# Patient Record
Sex: MALE | Race: BLACK OR AFRICAN AMERICAN | Employment: UNEMPLOYED | URBAN - METROPOLITAN AREA
[De-identification: names, ages, dates, MRNs, and addresses within clinical notes are randomized per-mention and may not be internally consistent; named-entity substitution may affect disease eponyms.]

---

## 2017-11-07 ENCOUNTER — ALLIED HEALTH/NURSE VISIT (OUTPATIENT)
Dept: NEUROLOGY | Facility: CLINIC | Age: 27
End: 2017-11-07

## 2017-11-07 ENCOUNTER — OFFICE VISIT (OUTPATIENT)
Dept: NEUROLOGY | Facility: CLINIC | Age: 27
End: 2017-11-07

## 2017-11-07 VITALS
SYSTOLIC BLOOD PRESSURE: 116 MMHG | HEART RATE: 91 BPM | DIASTOLIC BLOOD PRESSURE: 71 MMHG | HEIGHT: 70 IN | BODY MASS INDEX: 18.84 KG/M2 | WEIGHT: 131.6 LBS

## 2017-11-07 DIAGNOSIS — R68.89 SPELLS OF DECREASED ATTENTIVENESS: Primary | ICD-10-CM

## 2017-11-07 RX ORDER — LORAZEPAM 2 MG/1
TABLET ORAL
Qty: 2 TABLET | Refills: 0 | Status: ON HOLD | OUTPATIENT
Start: 2017-11-07 | End: 2018-01-15

## 2017-11-07 RX ORDER — LEVETIRACETAM 1000 MG/1
1000 TABLET ORAL 2 TIMES DAILY
Status: ON HOLD | COMMUNITY
Start: 2017-09-10 | End: 2018-01-15

## 2017-11-07 RX ORDER — LEVETIRACETAM 750 MG/1
TABLET ORAL
Qty: 120 TABLET | Refills: 3 | Status: SHIPPED | OUTPATIENT
Start: 2017-11-07 | End: 2018-09-14

## 2017-11-07 NOTE — LETTER
2017       RE: Geovanni Mullen  : 1990   MRN: [id number]      Dear Colleague,    Thank you for referring your patient, Geovanni Mullen, to the [facility]PILEPSY [facility] at [facility]. Please see a copy of my visit note below.    NEW PATIENT NOTE   IDENTIFYING INFORMATION:  Geovanni is accompanied with his girlfriend, Stephanie Torrez.  Taylorrhina provided much of the history.  He is a 27-year-old, left-handed male who presents with recurrent spells.  His first spell was 2016.  At that time he had smaller spells, in which he had an aura of feeling lightheaded, sweaty, dizzy, heart palpitations, anxiety, d?j? vu sensation, and sometimes this would progress to unresponsiveness, lip smacking and repetitive hand movements.  Early on, he was treated as though these were anxiety attacks; the healthcare providers did not think they were seizures.  He had one every month every other month, it seemed early on, and then this progressed to a generalized tonic-clonic convulsion in 2016.  He was sleeping at night.  Stephanie noted his whole body stiffened and rhythmically shook.  He had blood in his mouth, foaming at the mouth, and eyes were rolled back.  This lasted a few minutes, and it was suspected that perhaps these were seizures, and he was started on Depakote.  In 2016, Depakote was not effective, and he was transitioned to levetiracetam in 2016.  It seemed that his anxiety and panic attacks went down significantly, and he stopped having the smaller spells in which he felt lightheaded, sweaty, dizzy, heart palpitations, anxiety and d?j? vu.  However, he continued to have the spells in which he was unresponsive and the convulsions.  The patient is on levetiracetam 1000 mg twice a day with no significant side effects.  He did notice better control when he increased from 9571-2922 mg per day of levetiracetam.  He does not have a history of status epilepticus.        Spell type 1 is  lightheadedness, sweatiness, dizziness, heart palpitations, anxiety, d?j? vu feeling as though he has been there before.  He does at times have an escalating experience of anxiety, and it builds up and then he cannot breathe, and he hyperventilates and he cannot catch a breath, he states.  Interestingly enough, levetiracetam decreased these spells significantly.  The last one was 2017.  It seems to me that he may have 2 different types of spells.  One spell, he has d?j? vu, palpitations, sweatiness, lightheadedness with no escalating anxiety, and another set of spells, a subcategory, in which he has escalating anxiety, which may perhaps be panic attacks.  The initial spells may be simple partial seizures.        Spell type 2 is he has an aura in which he feels sick.  He feels anxiety, becomes unresponsive, has chewing movements, repetitive picking movements of his right hand.  These started in 2016, and the last time he had one was 10/30/2017 before [facility].  These usually last 1 minute.  He has probably has 10 witnessed spells since 2016.        Spell type 3 is whole body convulsion.  Usually in these the whole body stiffens, has rhythmic shaking, eyes roll back.  He makes a gurgling noise.  He usually bites his tongue.  He does not have urinary incontinence.  He had one at the end of 10/2017.  He has 1-3 per month usually.  He has also had instances where he has had 3 convulsions within a 24 hour period, 1 hour spaced by 1 hour.  He has not had significant trauma with his seizures.  He has bitten his tongue.  Seizures are triggered by fatigue, lack of sleep, television video games.        EPILEPSY RISK FACTORS:  His grandmother had a stroke and had seizures.  Mom had a  delivery, full-term.  He had no developmental delays.  He did hit his head in childhood when he was playing football.  No associated loss of consciousness.  He has no history of encephalitis, meningitis, strokes or tumors.   "No epilepsy or febrile seizures in childhood.      MEDICATIONS:  Levetiracetam 1000 mg twice a day.      PAST ANTIEPILEPTIC DRUGS:  Depakote 500 mg twice a day was not effective.        ALLERGIES TO MEDICATIONS:  No.        He does not use a pillbox.  I did give him a pillbox today and encouraged him to use it.      MEDICAL PROBLEMS:  The patient states he had a lung collapse.  He was unable to breathe while playing basketball, and he was hospitalized for this several years ago; I was unable to obtain further details on this.  He states that he did not have blunt trauma causing the lung to collapse.        FAMILY HISTORY:  Grandmother had stroke and seizures.      SOCIAL HISTORY:  He was born in [place].  He lived there until 9 years old, and then he moved to [place], then [place].  He moved to [place] when he was 11 years old.  He has never had any death experiences, physical or sexual abuse.  He completed high school.  He has had temporary jobs, mainly fast-food jobs.  He has 5 kids, ages 1, 3, 5, 7 and 8.  He is their primary caretaker.  He states he cannot work, and his girlfriend has to work for finances.  He feels overwhelmed by this and states that, \"The way I was raised, the man is supposed to work and bring home the money.\"  Alcohol:  He does not drink alcohol.  He smokes 5-6 cigarettes a day.  He has 6 cans of soda per day.  He smokes marijuana 3 times per week.  In the last month, he has not felt depressed, but he does feel helpless about the future because he is not able to work, and finances are a stress.  He does not have anhedonia.  He does have trouble sleeping at night.  He falls asleep at 3 a.m. in the morning.  He usually sleeps a couple hours and may take a nap in the morning.  He does not have regular sleep habits.        REVIEW OF SYSTEMS:  Notable for headache, right eye twitching, diffuse weakness, decreased appetite, constipation.  He sleeps 5-6 hours per night, usually at 3:00 " "a.m. in the morning.  He does have decreased memory.      EXAMINATION /71 (BP Location: Right arm, Patient Position: Chair, Cuff Size: Adult Regular)  Pulse 91  Ht 5' 10\" (177.8 cm)  Wt 131 lb 9.6 oz (59.7 kg)  BMI 18.88 kg/m2  GENERAL:  Alert and oriented x3.   CARDIOVASCULAR:  Regular rate and rhythm, positive S1, S2.   LUNGS:  Clear to auscultation bilaterally.   ABDOMEN:  Nondistended, nontender.  Normal active bowel sounds.      NEUROLOGICAL EXAMINATION   Mental Status and Higher Cortical Functions:  Alert and oriented to person, place, and time.  Speech fluent, with intact naming and repetition. No dysarthria.  Cranial Nerves (II-XII):  Pupils equal, round, and reactive to light.  Extraocular movements full with no nystagmus.  Visual fields full to confrontation.  Facial sensation intact to light touch, temperature, and pin prick.  Face symmetric at rest and with activation.  Hearing intact to finger rub bilaterally.  Tongue midline and palate elevation symmetric.  Sternocleidomastoid and trapezius 5/5 bilaterally.  Funduscopic exam was attempted, but it was difficult to visualize his fundi.  He had very small pupils.   Motor:  Normal tone, normal bulk, and no pronator drift.  No tremors or fasciculations. Arm/hand circumduction was symmetric.  Motor strength 5/5 in upper and lower extremities.   Sensation:  Intact to light touch, vibration, and temperature.    Coordination:  Normal finger-nose-finger, fine finger movements, and rapid alternating movements.  No ataxia or dysmetria.     Reflexes:  Deep tendon reflexes 2+ and symmetric throughout.    Gait:  Casual gait and stance normal.        IMPRESSION:  Mr. Mullen is a 27-year-old, left-handed male who presents with recurrent paroxsymal spells. The clinical description of this spells are concerning for seizures.  I suspect that the patient has simple partial seizures, complex partial seizures, and complex partial seizure that secondarily " generalize. 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  There are some suggestions that he may have anxiety attacks which may be due to anxiety disorder and auras (that have anxiety as a component) that may be simple partial seizures, but it is difficult to delineate clinically. It would be helpful to admit patient for prolonged Video EEG monitoring to charterize spells, determine interictal, and ictal burden, seizure type and onset and better define semiology.  We will increase levetiracetam to 1500 mg twice a day, since he continues to have a high burden of recurrent paroxysmal spells that are concerning for seizures despite being on levetiracetam 1000 mg twice a day.  The patient was agreeable to being admitted to the hospital and understands he may have to stay there 5-7 days, and they should find alternate childcare arrangements.         PLAN:     1.  Increase levetiracetam to 1500 mg twice a day.                    Medication Name   Tablet Size         AM  (morning)   PM (Night)   Notes    Week 1   levetiracetam 750 mg   1 tablet   (750 mg)    2 tablet   (1500 mg)      Week 2   levetiracetam 750 mg   2 tablet   (1500 mg)   2 tablet   (1500 mg)         2.  MRI of the brain to evaluate for etiology of seizures.   3.  Inpatient hospital admission.   4.  Inpatient psychiatrist consultation   5.  Nurse education on hospital admission.   6.  The patient was advised to maintain proper seizure precautions. [place] regulations on driving were reviewed with the patient. The patient clearly understands that she/he is prohibited from operating a motor vehicle within 3 months following any seizure or other episode with sudden unconsciousness or inability to sit up, and that it is required to report most recent seizure to the DMV within 30 days after the event.    Patient was advised to avoid any activities that might lead to self-injury or injury of others, within 3 months following any seizure  with impaired awareness or impaired motor control such activities include but are not limited to operating power tools, operating firearms, climbing ladders/trees/exposure to heights from which he might fall. Patient should not operate power tools or heavy machinery and equipment.  Patient was advised not to swim alone.  Patient should not bathe in any form of tub, such as bathtub, jacuzzi, or hot tub unless there is a responsible adult close by to provide assistance in case she has a seizure and drowns. Patient should not work on hot surfaces such stoves, ovens, or with scalding hot water.   7.   Follow up after hospital admission.       I spent 60 minutes with the patient. During this time counseling and coordination of care exceeded 50% of the face to face visit time. I addressed all questions the patient raised in regards to their medical care.           SIRISHA RIBEIRO MD             D: 2017 16:35   T: 2017 11:04   MT: [email or web address]      Name:     MELANIE HUMPHREY   MRN:      [id number]        Account:      [id number]   :      1990           Service Date: 2017      Document: [id number]        Again, thank you for allowing me to participate in the care of your patient.      Sincerely,    Sirisha Ribeiro MD

## 2017-11-07 NOTE — MR AVS SNAPSHOT
After Visit Summary   11/7/2017    Geovanni Mullen    MRN: [id number]           Patient Information     Date Of Birth          1990        Visit Information        Provider Department      11/7/2017 9:00 AM Sirisha Baxter MD [facility]        Today's Diagnoses     Spells of decreased attentiveness    -  1      Care Instructions                     Medication Name   Tablet Size         AM  (morning)   PM (Night)   Notes    Week 1   levetiracetam 750 mg   1 tablet   (750 mg)    2 tablet   (1500 mg)      Week 2   levetiracetam 750 mg   2 tablet   (1500 mg)   2 tablet   (1500 mg)       CONTINUE TAKING YOUR OTHER MEDICATIONS AS PREVIOUSLY DIRECTED.  IF YOU  HAVE ANY SIDE EFFECTS OR CONCERNS ABOUT YOUR ANTIEPILEPTIC DRUG CALL [facility] OFFICE [phone]. PLEASE FOLLOW MEDICATION CHANGES AS ADVISED.       1. Hospital admission (5-7 days)   2. Nurse Education   3. MRI brain take ativan 2 mg tablet 45 minutes before MRI brain. If not sleepy after 20 minutes. Take ativan another 2 mg tablet again. Do not drive.   4. [place] regulations on driving were reviewed with you and you should not drive until you are three months seizure/spell free.You are prohibited from operating a motor vehicle within 3 months following any seizure or other episode with sudden unconsciousness or inability to sit up, and that it is required to report most recent seizure to the DMV within 30 days after the event.    Avoid any activities that might lead to self-injury or injury of others, within 3 months following any seizure with impaired awareness or impaired motor control such activities include but are not limited to operating power tools, operating firearms, climbing ladders/trees/exposure to heights from which he might fall. Do not operate power tools or heavy machinery and equipment.  Do not swim alone, bathe in any form of tub, such as bathtub, jacuzzi, or hot tub unless there is a responsible adult close by to  provide assistance in case she has a seizure and drowns. Avoid work on hot surfaces such stoves, ovens, or with scalding hot water.       ELY RIBEIRO MD               Follow-ups after your visit        Additional Services     [facility] Patient Education Referral                 Your next 10 appointments already scheduled     2017 11:30 AM CST   EEG with [facility] EEG 1   [facility] ([facility])    [address], [address]  [facility] [address] [phone]              Future tests that were ordered for you today     Open Future Orders        Priority Expected Expires Ordered    MR Brain w/o & w Contrast Routine  2018            Who to contact     Please call your clinic at [phone] to:    Ask questions about your health    Make or cancel appointments    Discuss your medicines    Learn about your test results    Speak to your doctor   If you have compliments or concerns about an experience at your clinic, or if you wish to file a complaint, please contact [facility] Patient Relations at [phone] or email us at [email or web address]         Additional Information About Your Visit        MobileRQhart Information     Unicotript is an electronic gateway that provides easy, online access to your medical records. With Netheos, you can request a clinic appointment, read your test results, renew a prescription or communicate with your care team.     To sign up for Unicotript visit the website at [email or web address]   You will be asked to enter the access code listed below, as well as some personal information. Please follow the directions to create your username and password.     Your access code is: [email or web address]  Expires: 2018 11:14 AM     Your access code will  in 90 days. If you need help or a new code, please contact your [facility] or call [phone] for assistance.        Care  "EveryWhere ID     This is your Care EveryWhere ID. This could be used by other organizations to access your [facility] medical records  [id number]        Your Vitals Were     Pulse Height BMI (Body Mass Index)             91 5' 10\" (177.8 cm) 18.88 kg/m2          Blood Pressure from Last 3 Encounters:   11/07/17 116/71    Weight from Last 3 Encounters:   11/07/17 131 lb 9.6 oz (59.7 kg)              We Performed the Following     Admit to Inpatient     [facility] Patient Education Referral          Today's Medication Changes          These changes are accurate as of: 11/7/17 11:14 AM.  If you have any questions, ask your nurse or doctor.               Start taking these medicines.        Dose/Directions    LORazepam 2 MG tablet   Commonly known as:  ATIVAN   Used for:  Spells of decreased attentiveness   Started by:  Sirisha Baxter MD        MRI brain take ativan 2 mg tablet 45 minutes before MRI brain. If not sleepy after 20 minutes. Take ativan another 2 mg tablet again.   Quantity:  2 tablet   Refills:  0         These medicines have changed or have updated prescriptions.        Dose/Directions    * levETIRAcetam 1000 MG Tabs   This may have changed:  Another medication with the same name was added. Make sure you understand how and when to take each.   Used for:  Spells of decreased attentiveness   Changed by:  Sirisha Baxter MD        Dose:  1000 mg   Take 1,000 mg by mouth   Refills:  0       * levETIRAcetam 750 MG tablet   Commonly known as:  KEPPRA   This may have changed:  You were already taking a medication with the same name, and this prescription was added. Make sure you understand how and when to take each.   Used for:  Spells of decreased attentiveness   Changed by:  Sirisha Baxter MD        Titrate to 1500 mg twice a day   Quantity:  120 tablet   Refills:  3       * Notice:  This list has 2 medication(s) that are the same as other medications prescribed for you. Read the directions carefully, and ask your " doctor or other care provider to review them with you.         Where to get your medicines      These medications were sent to [id number] - [place], [place] - [address] AT [facility] [address], [place] [address]     Phone:  [phone]     levETIRAcetam 750 MG tablet         Some of these will need a paper prescription and others can be bought over the counter.  Ask your nurse if you have questions.     Bring a paper prescription for each of these medications     LORazepam 2 MG tablet                Primary Care Provider    None Specified       No primary provider on file.        Equal Access to Services     [facility]: Hadii haleigh Wolf, waaxda lutitoadaha, qaybta kaalmada gertrude, zina franklin . So [facility] [phone].    ATENCIÓN: Si habla español, tiene a cortez disposición servicios gratuitos de asistencia lingüística. [facility] [phone].    We comply with applicable federal civil rights laws and [place] laws. We do not discriminate on the basis of race, color, national origin, age, disability, sex, sexual orientation, or gender identity.            Thank you!     Thank you for choosing [facility] EPILEPSY [facility]  for your care. Our goal is always to provide you with excellent care. Hearing back from our patients is one way we can continue to improve our services. Please take a few minutes to complete the written survey that you may receive in the mail after your visit with us. Thank you!             Your Updated Medication List - Protect others around you: Learn how to safely use, store and throw away your medicines at www.disposemymeds.org.          This list is accurate as of: 11/7/17 11:14 AM.  Always use your most recent med list.                   Brand Name Dispense Instructions for use Diagnosis    * levETIRAcetam 1000 MG Tabs      Take 1,000 mg by mouth    Spells of decreased attentiveness       * levETIRAcetam 750  MG tablet    KEPPRA    120 tablet    Titrate to 1500 mg twice a day    Spells of decreased attentiveness       LORazepam 2 MG tablet    ATIVAN    2 tablet    MRI brain take ativan 2 mg tablet 45 minutes before MRI brain. If not sleepy after 20 minutes. Take ativan another 2 mg tablet again.    Spells of decreased attentiveness       * Notice:  This list has 2 medication(s) that are the same as other medications prescribed for you. Read the directions carefully, and ask your doctor or other care provider to review them with you.

## 2017-11-07 NOTE — LETTER
Date:November 9, 2017      Patient was self referred, no letter generated. Do not send.        [facility] Health Information

## 2017-11-07 NOTE — MR AVS SNAPSHOT
After Visit Summary   2017    Geovanni Mullen    MRN: [id number]           Patient Information     Date Of Birth          1990        Visit Information        Provider Department      2017 11:30 AM [facility] EEG 1 [facility]        Today's Diagnoses     Spells of decreased attentiveness    -  1       Follow-ups after your visit        Who to contact     Please call your clinic at [phone] to:    Ask questions about your health    Make or cancel appointments    Discuss your medicines    Learn about your test results    Speak to your doctor   If you have compliments or concerns about an experience at your clinic, or if you wish to file a complaint, please contact [facility] Patient Relations at [phone] or email us at [email or web address]         Additional Information About Your Visit        MyChart Information     Metwit is an electronic gateway that provides easy, online access to your medical records. With Metwit, you can request a clinic appointment, read your test results, renew a prescription or communicate with your care team.     To sign up for [employer]il[employer] visit the website at [email or web address]   You will be asked to enter the access code listed below, as well as some personal information. Please follow the directions to create your username and password.     Your access code is: [email or web address]  Expires: 2018 11:14 AM     Your access code will  in 90 days. If you need help or a new code, please contact your [facility] or call [phone] for assistance.        Care EveryWhere ID     This is your Care EveryWhere ID. This could be used by other organizations to access your [place] medical records  [id number]         Blood Pressure from Last 3 Encounters:   No data found for BP    Weight from Last 3 Encounters:   No data found for Wt              Today, you had the following     No  orders found for display         Today's Medication Changes          These changes are accurate as of: 11/7/17 11:59 PM.  If you have any questions, ask your nurse or doctor.               Start taking these medicines.        Dose/Directions    LORazepam 2 MG tablet   Commonly known as:  ATIVAN   Used for:  Spells of decreased attentiveness   Started by:  Sirisha Baxter MD        MRI brain take ativan 2 mg tablet 45 minutes before MRI brain. If not sleepy after 20 minutes. Take ativan another 2 mg tablet again.   Quantity:  2 tablet   Refills:  0         These medicines have changed or have updated prescriptions.        Dose/Directions    * levETIRAcetam 1000 MG Tabs   This may have changed:  Another medication with the same name was added. Make sure you understand how and when to take each.   Used for:  Spells of decreased attentiveness   Changed by:  Sirisha Baxter MD        Dose:  1000 mg   Take 1,000 mg by mouth   Refills:  0       * levETIRAcetam 750 MG tablet   Commonly known as:  KEPPRA   This may have changed:  You were already taking a medication with the same name, and this prescription was added. Make sure you understand how and when to take each.   Used for:  Spells of decreased attentiveness   Changed by:  Sirisha Baxter MD        Titrate to 1500 mg twice a day   Quantity:  120 tablet   Refills:  3       * Notice:  This list has 2 medication(s) that are the same as other medications prescribed for you. Read the directions carefully, and ask your doctor or other care provider to review them with you.         Where to get your medicines      These medications were sent to [id number] - [place], [place] - [address] AT [facility] [address], Health system [address]     Phone:  [phone]     levETIRAcetam 750 MG tablet         Some of these will need a paper prescription and others can be bought over the counter.  Ask your nurse if you have questions.     Bring  a paper prescription for each of these medications     LORazepam 2 MG tablet                Primary Care Provider    None Specified       No primary provider on file.        Equal Access to Services     ANIL CROOKS : Hadii aad ku hadjasonnatalie Wilsoncadyali, isaacvj colladolorenaha, jose alejandro franklintori vianeymamie, zina chynain hayaakota wintersmercy jajapeggy noemi vieyra. So [facility] [phone].    ATENCIÓN: Si habla español, tiene a cortez disposición servicios gratuitos de asistencia lingüística. Oseasame al [phone].    We comply with applicable federal civil rights laws and [place] laws. We do not discriminate on the basis of race, color, national origin, age, disability, sex, sexual orientation, or gender identity.            Thank you!     Thank you for choosing [facility]  for your care. Our goal is always to provide you with excellent care. Hearing back from our patients is one way we can continue to improve our services. Please take a few minutes to complete the written survey that you may receive in the mail after your visit with us. Thank you!             Your Updated Medication List - Protect others around you: Learn how to safely use, store and throw away your medicines at www.disposemymeds.org.          This list is accurate as of: 11/7/17 11:59 PM.  Always use your most recent med list.                   Brand Name Dispense Instructions for use Diagnosis    * levETIRAcetam 1000 MG Tabs      Take 1,000 mg by mouth    Spells of decreased attentiveness       * levETIRAcetam 750 MG tablet    KEPPRA    120 tablet    Titrate to 1500 mg twice a day    Spells of decreased attentiveness       LORazepam 2 MG tablet    ATIVAN    2 tablet    MRI brain take ativan 2 mg tablet 45 minutes before MRI brain. If not sleepy after 20 minutes. Take ativan another 2 mg tablet again.    Spells of decreased attentiveness       * Notice:  This list has 2 medication(s) that are the same as other medications prescribed for you. Read the directions carefully,  and ask your doctor or other care provider to review them with you.

## 2017-11-07 NOTE — PATIENT INSTRUCTIONS
Medication Name   Tablet Size         AM  (morning)   PM (Night)   Notes    Week 1   levetiracetam 750 mg   1 tablet   (750 mg)    2 tablet   (1500 mg)      Week 2   levetiracetam 750 mg   2 tablet   (1500 mg)   2 tablet   (1500 mg)       CONTINUE TAKING YOUR OTHER MEDICATIONS AS PREVIOUSLY DIRECTED.  IF YOU  HAVE ANY SIDE EFFECTS OR CONCERNS ABOUT YOUR ANTIEPILEPTIC DRUG CALL [facility] OFFICE [phone]. PLEASE FOLLOW MEDICATION CHANGES AS ADVISED.       1. Hospital admission (5-7 days)   2. Nurse Education   3. MRI brain take ativan 2 mg tablet 45 minutes before MRI brain. If not sleepy after 20 minutes. Take ativan another 2 mg tablet again. Do not drive.   4. [place] regulations on driving were reviewed with you and you should not drive until you are three months seizure/spell free.You are prohibited from operating a motor vehicle within 3 months following any seizure or other episode with sudden unconsciousness or inability to sit up, and that it is required to report most recent seizure to the DMV within 30 days after the event.    Avoid any activities that might lead to self-injury or injury of others, within 3 months following any seizure with impaired awareness or impaired motor control such activities include but are not limited to operating power tools, operating firearms, climbing ladders/trees/exposure to heights from which he might fall. Do not operate power tools or heavy machinery and equipment.  Do not swim alone, bathe in any form of tub, such as bathtub, jacuzzi, or hot tub unless there is a responsible adult close by to provide assistance in case she has a seizure and drowns. Avoid work on hot surfaces such stoves, ovens, or with scalding hot water.       ELY RIBEIRO MD

## 2017-11-08 NOTE — PROGRESS NOTES
NEW PATIENT NOTE   IDENTIFYING INFORMATION:  Geovanni is accompanied with his girlfriend, Stephanie Torrez.  Stephanie provided much of the history.  He is a 27-year-old, left-handed male who presents with recurrent spells.  His first spell was 02/2016.  At that time he had smaller spells, in which he had an aura of feeling lightheaded, sweaty, dizzy, heart palpitations, anxiety, d?j? vu sensation, and sometimes this would progress to unresponsiveness, lip smacking and repetitive hand movements.  Early on, he was treated as though these were anxiety attacks; the healthcare providers did not think they were seizures.  He had one every month every other month, it seemed early on, and then this progressed to a generalized tonic-clonic convulsion in 07/2016.  He was sleeping at night.  Stephanie noted his whole body stiffened and rhythmically shook.  He had blood in his mouth, foaming at the mouth, and eyes were rolled back.  This lasted a few minutes, and it was suspected that perhaps these were seizures, and he was started on Depakote.  In 07/2016, Depakote was not effective, and he was transitioned to levetiracetam in 11/2016.  It seemed that his anxiety and panic attacks went down significantly, and he stopped having the smaller spells in which he felt lightheaded, sweaty, dizzy, heart palpitations, anxiety and d?j? vu.  However, he continued to have the spells in which he was unresponsive and the convulsions.  The patient is on levetiracetam 1000 mg twice a day with no significant side effects.  He did notice better control when he increased from 1455-6661 mg per day of levetiracetam.  He does not have a history of status epilepticus.        Spell type 1 is lightheadedness, sweatiness, dizziness, heart palpitations, anxiety, d?j? vu feeling as though he has been there before.  He does at times have an escalating experience of anxiety, and it builds up and then he cannot breathe, and he hyperventilates and he cannot catch a  breath, he states.  Interestingly enough, levetiracetam decreased these spells significantly.  The last one was 2017.  It seems to me that he may have 2 different types of spells.  One spell, he has d?j? vu, palpitations, sweatiness, lightheadedness with no escalating anxiety, and another set of spells, a subcategory, in which he has escalating anxiety, which may perhaps be panic attacks.  The initial spells may be simple partial seizures.        Spell type 2 is he has an aura in which he feels sick.  He feels anxiety, becomes unresponsive, has chewing movements, repetitive picking movements of his right hand.  These started in 2016, and the last time he had one was 10/30/2017 before [facility].  These usually last 1 minute.  He has probably has 10 witnessed spells since 2016.        Spell type 3 is whole body convulsion.  Usually in these the whole body stiffens, has rhythmic shaking, eyes roll back.  He makes a gurgling noise.  He usually bites his tongue.  He does not have urinary incontinence.  He had one at the end of 10/2017.  He has 1-3 per month usually.  He has also had instances where he has had 3 convulsions within a 24 hour period, 1 hour spaced by 1 hour.  He has not had significant trauma with his seizures.  He has bitten his tongue.  Seizures are triggered by fatigue, lack of sleep, television video games.        EPILEPSY RISK FACTORS:  His grandmother had a stroke and had seizures.  Mom had a  delivery, full-term.  He had no developmental delays.  He did hit his head in childhood when he was playing football.  No associated loss of consciousness.  He has no history of encephalitis, meningitis, strokes or tumors.  No epilepsy or febrile seizures in childhood.      MEDICATIONS:  Levetiracetam 1000 mg twice a day.      PAST ANTIEPILEPTIC DRUGS:  Depakote 500 mg twice a day was not effective.        ALLERGIES TO MEDICATIONS:  No.        He does not use a pillbox.  I did give him a  "pillbox today and encouraged him to use it.      MEDICAL PROBLEMS:  The patient states he had a lung collapse.  He was unable to breathe while playing basketball, and he was hospitalized for this several years ago; I was unable to obtain further details on this.  He states that he did not have blunt trauma causing the lung to collapse.        FAMILY HISTORY:  Grandmother had stroke and seizures.      SOCIAL HISTORY:  He was born in [place].  He lived there until 9 years old, and then he moved to [place], then [place].  He moved to [place] when he was 11 years old.  He has never had any death experiences, physical or sexual abuse.  He completed high school.  He has had temporary jobs, mainly fast-food jobs.  He has 5 kids, ages 1, 3, 5, 7 and 8.  He is their primary caretaker.  He states he cannot work, and his girlfriend has to work for finances.  He feels overwhelmed by this and states that, \"The way I was raised, the man is supposed to work and bring home the money.\"  Alcohol:  He does not drink alcohol.  He smokes 5-6 cigarettes a day.  He has 6 cans of soda per day.  He smokes marijuana 3 times per week.  In the last month, he has not felt depressed, but he does feel helpless about the future because he is not able to work, and finances are a stress.  He does not have anhedonia.  He does have trouble sleeping at night.  He falls asleep at 3 a.m. in the morning.  He usually sleeps a couple hours and may take a nap in the morning.  He does not have regular sleep habits.        REVIEW OF SYSTEMS:  Notable for headache, right eye twitching, diffuse weakness, decreased appetite, constipation.  He sleeps 5-6 hours per night, usually at 3:00 a.m. in the morning.  He does have decreased memory.      EXAMINATION /71 (BP Location: Right arm, Patient Position: Chair, Cuff Size: Adult Regular)  Pulse 91  Ht 5' 10\" (177.8 cm)  Wt 131 lb 9.6 oz (59.7 kg)  BMI 18.88 kg/m2  GENERAL:  Alert and oriented " x3.   CARDIOVASCULAR:  Regular rate and rhythm, positive S1, S2.   LUNGS:  Clear to auscultation bilaterally.   ABDOMEN:  Nondistended, nontender.  Normal active bowel sounds.      NEUROLOGICAL EXAMINATION   Mental Status and Higher Cortical Functions:  Alert and oriented to person, place, and time.  Speech fluent, with intact naming and repetition. No dysarthria.  Cranial Nerves (II-XII):  Pupils equal, round, and reactive to light.  Extraocular movements full with no nystagmus.  Visual fields full to confrontation.  Facial sensation intact to light touch, temperature, and pin prick.  Face symmetric at rest and with activation.  Hearing intact to finger rub bilaterally.  Tongue midline and palate elevation symmetric.  Sternocleidomastoid and trapezius 5/5 bilaterally.  Funduscopic exam was attempted, but it was difficult to visualize his fundi.  He had very small pupils.   Motor:  Normal tone, normal bulk, and no pronator drift.  No tremors or fasciculations. Arm/hand circumduction was symmetric.  Motor strength 5/5 in upper and lower extremities.   Sensation:  Intact to light touch, vibration, and temperature.    Coordination:  Normal finger-nose-finger, fine finger movements, and rapid alternating movements.  No ataxia or dysmetria.     Reflexes:  Deep tendon reflexes 2+ and symmetric throughout.    Gait:  Casual gait and stance normal.        IMPRESSION:  Mr. Mullen is a 27-year-old, left-handed male who presents with recurrent paroxsymal spells. The clinical description of this spells are concerning for seizures.  I suspect that the patient has simple partial seizures, complex partial seizures, and complex partial seizure that secondarily generalize. 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  There are some suggestions that he may have anxiety attacks which may be due to anxiety disorder and auras (that have anxiety as a component) that may be simple partial seizures, but it is  difficult to delineate clinically. It would be helpful to admit patient for prolonged Video EEG monitoring to charterize spells, determine interictal, and ictal burden, seizure type and onset and better define semiology.  We will increase levetiracetam to 1500 mg twice a day, since he continues to have a high burden of recurrent paroxysmal spells that are concerning for seizures despite being on levetiracetam 1000 mg twice a day.  The patient was agreeable to being admitted to the hospital and understands he may have to stay there 5-7 days, and they should find alternate childcare arrangements.         PLAN:     1.  Increase levetiracetam to 1500 mg twice a day.                    Medication Name   Tablet Size         AM  (morning)   PM (Night)   Notes    Week 1   levetiracetam 750 mg   1 tablet   (750 mg)    2 tablet   (1500 mg)      Week 2   levetiracetam 750 mg   2 tablet   (1500 mg)   2 tablet   (1500 mg)         2.  MRI of the brain to evaluate for etiology of seizures.   3.  Inpatient hospital admission.   4.  Inpatient psychiatrist consultation   5.  Nurse education on hospital admission.   6.  The patient was advised to maintain proper seizure precautions. [place] regulations on driving were reviewed with the patient. The patient clearly understands that she/he is prohibited from operating a motor vehicle within 3 months following any seizure or other episode with sudden unconsciousness or inability to sit up, and that it is required to report most recent seizure to the DMV within 30 days after the event.    Patient was advised to avoid any activities that might lead to self-injury or injury of others, within 3 months following any seizure with impaired awareness or impaired motor control such activities include but are not limited to operating power tools, operating firearms, climbing ladders/trees/exposure to heights from which he might fall. Patient should not operate power tools or heavy machinery and  equipment.  Patient was advised not to swim alone.  Patient should not bathe in any form of tub, such as bathtub, jacuzzi, or hot tub unless there is a responsible adult close by to provide assistance in case she has a seizure and drowns. Patient should not work on hot surfaces such stoves, ovens, or with scalding hot water.   7.   Follow up after hospital admission.       I spent 60 minutes with the patient. During this time counseling and coordination of care exceeded 50% of the face to face visit time. I addressed all questions the patient raised in regards to their medical care.           ELY RIBEIRO MD             D: 2017 16:35   T: 2017 11:04   MT: [email or web address]      Name:     MELANIE HUMPHREY   MRN:      [id number]        Account:      [id number]   :      1990           Service Date: 2017      Document: [id number]

## 2017-11-15 ASSESSMENT — PATIENT HEALTH QUESTIONNAIRE - PHQ9: SUM OF ALL RESPONSES TO PHQ QUESTIONS 1-9: 14

## 2017-11-15 NOTE — PROCEDURES
EEG #:  [id number].      This is a 2-hour EEG on 2017.      SOURCE FILE DURATION:  2 hours.      PATIENT INFORMATION:  A 27-year-old male presents with recurrent spells of zoning out and convulsions.  EEG is being done to evaluate for seizures.     TECHNICAL SUMMARY: This video EEG monitoring procedure was performed with 23 scalp electrodes in 10-20 system placements, and additional scalp, precordial and other surface electrodes used for electrical referencing and artifact detection. No electro clinical seizures or any electrographic seizures were seen. Video was reviewed intermittently by EEG technologist and physician for clinical seizures.     BACKGROUND ACTIVITY:  During wakefulness, the background activity consists of synchronous and symmetric, well modulated, 10-11 Hz posterior dominant rhythm that attenuated with eye opening. No focal abnormalities were observed. The patient is awake for the entire record.     ACTIVATION PROCEDURE: Photic stimulation did not produce any abnormalities. Hyperventilation did not produce any significant abnormalities.     EPILEPTIFORM DISCHARGES: No epileptiform activity was recorded.    ICTAL:  No clinical events or electrographic seizures were recorded. . Video was reviewed intermittently by EEG technologist and physcian for clinical seizures.     IMPRESSION OF VIDEO EEG: This is a normal awake and sleep video electroencephalogram. No electrographic seizures or epileptiform discharges were recorded.     ELY RIBEIRO MD                 D: 2017 16:16   T: 2017 16:36   MT: al      Name:     MELANIE HUMPHREY   MRN:      [id number]        Account:        [id number]   :      1990           Procedure Date: 2017      Document: [id number]

## 2017-12-06 ENCOUNTER — TELEPHONE (OUTPATIENT)
Dept: NEUROLOGY | Facility: CLINIC | Age: 27
End: 2017-12-06

## 2017-12-06 NOTE — LETTER
2017       RE: Geovanni Mullen  : 1990   MRN: [id number]      You may share this letter with whoever you choose.    Geovanni Mullen is unable to continue caring for Sergo's two children as he is at risk of having a seizure/spell.     Sincerely,                Sirisha Baxter MD

## 2017-12-06 NOTE — TELEPHONE ENCOUNTER
"Consent to communicate with Sergo is on file 11/8/17:    Discussed with Dr. Baxter: EEG results were convyed to Sergo:    \"Normal awake and sleep video electroenephalogram. No electrographic seizures or epileptiform discharges were recorded\".    Sergo also requests a letter stating that Geovanni is unable to watch her children (age 1 and age 7) due to possibility of seizures/spells.    Letter prepared and routed to MD For signature  "

## 2017-12-08 NOTE — TELEPHONE ENCOUNTER
Contacted Geovanni per request of Dr. Sahil Baxter is not comfortable signing letter stating that Geovanni is unable to watch Sergo's children.    This was discussed with Ceola who verbalized understanding.    No further action needed.

## 2017-12-12 ENCOUNTER — VIRTUAL VISIT (OUTPATIENT)
Dept: NEUROLOGY | Facility: CLINIC | Age: 27
End: 2017-12-12

## 2017-12-12 DIAGNOSIS — R68.89 SPELLS OF DECREASED ATTENTIVENESS: Primary | ICD-10-CM

## 2017-12-12 NOTE — MR AVS SNAPSHOT
After Visit Summary   2017    Geovanni Mullen    MRN: [id number]           Patient Information     Date Of Birth          1990        Visit Information        Provider Department      2017 2:30 PM [facility]        Today's Diagnoses     Spells of decreased attentiveness    -  1       Follow-ups after your visit        Who to contact     Please call your clinic at [phone] to:    Ask questions about your health    Make or cancel appointments    Discuss your medicines    Learn about your test results    Speak to your doctor   If you have compliments or concerns about an experience at your clinic, or if you wish to file a complaint, please contact [facility] Patient Relations at [phone] or email us at [email or web address]         Additional Information About Your Visit        MyChart Information     [employer] is an electronic gateway that provides easy, online access to your medical records. With [employer], you can request a clinic appointment, read your test results, renew a prescription or communicate with your care team.     To sign up for Fraxiont visit the website at [email or web address]   You will be asked to enter the access code listed below, as well as some personal information. Please follow the directions to create your username and password.     Your access code is: [email or web address]  Expires: 2018 11:14 AM     Your access code will  in 90 days. If you need help or a new code, please contact your [facility] or call [phone] for assistance.        Care EveryWhere ID     This is your Care EveryWhere ID. This could be used by other organizations to access your [place] medical records  [id number]         Blood Pressure from Last 3 Encounters:   17 116/71    Weight from Last 3 Encounters:   17 131 lb 9.6 oz (59.7 kg)              Today, you had the  following     No orders found for display       Primary Care Provider    None Specified       No primary provider on file.        Equal Access to Services     BERNYMO DAKSHA : Hadii aad ku hadjasonnatalie Wolf, wasarahda simin, qadulcebrandon reidtimbovj loredo, zina wilkinsondorianbeatriz franklin . So [facility] [phone].    ATENCIÓN: Si habla español, tiene a cortez disposición servicios gratuitos de asistencia lingüística. Llame al [phone].    We comply with applicable federal civil rights laws and [place] laws. We do not discriminate on the basis of race, color, national origin, age, disability, sex, sexual orientation, or gender identity.            Thank you!     Thank you for choosing [facility]  for your care. Our goal is always to provide you with excellent care. Hearing back from our patients is one way we can continue to improve our services. Please take a few minutes to complete the written survey that you may receive in the mail after your visit with us. Thank you!             Your Updated Medication List - Protect others around you: Learn how to safely use, store and throw away your medicines at www.disposemymeds.org.          This list is accurate as of: 12/12/17  3:40 PM.  Always use your most recent med list.                   Brand Name Dispense Instructions for use Diagnosis    * levETIRAcetam 1000 MG Tabs      Take 1,000 mg by mouth    Spells of decreased attentiveness       * levETIRAcetam 750 MG tablet    KEPPRA    120 tablet    Titrate to 1500 mg twice a day    Spells of decreased attentiveness       LORazepam 2 MG tablet    ATIVAN    2 tablet    MRI brain take ativan 2 mg tablet 45 minutes before MRI brain. If not sleepy after 20 minutes. Take ativan another 2 mg tablet again.    Spells of decreased attentiveness       * Notice:  This list has 2 medication(s) that are the same as other medications prescribed for you. Read the directions carefully, and ask your doctor or other care provider  to review them with you.

## 2017-12-12 NOTE — PROGRESS NOTES
Spoke to Sergo and she has signed CARON. They needed a  letter. I typed one and will send it to them.       Sirisha Baxter MD

## 2017-12-12 NOTE — LETTER
2017    ATTENTION: CASE NUMBER [id number]    FAX# [phone]       RE: Geovanni Mullen  : 1990   MRN: [id number]      To Whom It May Concern,       Mr. Mullen has a medical condition which causes recurrent and paroxsymal loss of awareness. This raises concerns for child endangerment if he is the primary caregiver. I have encouraged him and family to seek alternative  arrangements.       Thank you.       Sirisha Baxter MD

## 2017-12-20 ENCOUNTER — TELEPHONE (OUTPATIENT)
Dept: NEUROLOGY | Facility: CLINIC | Age: 27
End: 2017-12-20

## 2017-12-20 NOTE — LETTER
2017    ATTENTION: CASE NUMBER [id number]    FAX# [phone]       RE: Geovanni Mullen  : 1990   MRN: [id number]        To Whom It May Concern,       Mr. Mullen has a medical condition which causes recurrent and paroxsymal loss of awareness. This raises concerns for child endangerment if he is the primary caregiver.     Effective 2017 (the date I first evaluated him) he is unable to be the primary caregiver for children.    I have encouraged him and family to seek alternative  arrangements.       Thank you.       Sirisha Baxter MD

## 2017-12-20 NOTE — TELEPHONE ENCOUNTER
"----- Message from Shona Alvarenga LPN sent at 12/20/2017 10:55 AM CST -----  Regarding: Letter  Provider wrote a letter that is currently in the chart. Girlfriend needs the letter updated to say \"As of 2 months ago, patient has not been able to care for the kids\".  It needs to be refaxed to [phone] per the girlfriend. She would like this done ASAP  "

## 2017-12-20 NOTE — TELEPHONE ENCOUNTER
Discussed with .  Letter can be updated to reflect that he has been unable to provide safe care for the children effect the first date  assessed the patient.    Letter updated, and printed to be signed / faxed

## 2018-01-11 ENCOUNTER — VIRTUAL VISIT (OUTPATIENT)
Dept: NEUROLOGY | Facility: CLINIC | Age: 28
End: 2018-01-11
Payer: COMMERCIAL

## 2018-01-11 DIAGNOSIS — R68.89 SPELLS OF DECREASED ATTENTIVENESS: Primary | ICD-10-CM

## 2018-01-11 NOTE — MR AVS SNAPSHOT
After Visit Summary   2018    Geovanni Mullen    MRN: [id number]           Patient Information     Date Of Birth          1990        Visit Information        Provider Department      2018 10:30 AM Herminio Devlin p Nurse [facility]        Today's Diagnoses     Spells of decreased attentiveness    -  1       Follow-ups after your visit        Your next 10 appointments already scheduled     Nitesh 15, 2018 10:00 AM CST   (Arrive by 9:15 AM)   Video Hookup Visit with [facility] EEG TECH 4   [facility] EEG ([facility])    [facility]  [address] [id number] [phone]           [place]: Your appointment is scheduled at [facility]. [address] [place] [address]              Who to contact     Please call your clinic at [phone] to:    Ask questions about your health    Make or cancel appointments    Discuss your medicines    Learn about your test results    Speak to your doctor   If you have compliments or concerns about an experience at your clinic, or if you wish to file a complaint, please contact [facility] Patient Relations at [phone] or email us at [email or web address]         Additional Information About Your Visit        MyChart Information     [employer] is an electronic gateway that provides easy, online access to your medical records. With [place], you can request a clinic appointment, read your test results, renew a prescription or communicate with your care team.     To sign up for Simplifyt visit the website at [email or web address]   You will be asked to enter the access code listed below, as well as some personal information. Please follow the directions to create your username and password.     Your access code is: [email or web address]  Expires: 2018 11:14 AM     Your access code will  in 90 days. If you need help or a new code, please  contact your [facility] or call [phone] for assistance.        Care EveryWhere ID     This is your Care EveryWhere ID. This could be used by other organizations to access your [place] medical records  [id number]         Blood Pressure from Last 3 Encounters:   11/07/17 116/71    Weight from Last 3 Encounters:   11/07/17 131 lb 9.6 oz (59.7 kg)              Today, you had the following     No orders found for display       Primary Care Provider    None Specified       No primary provider on file.        Equal Access to Services     ANIL CROOKS : Hadii aad ku hadasho Soomaali, waaxda luqadaha, qaybta kaalmada adeegyada, zina franklin . So [facility] [place] [phone].    ATENCIÓN: Si habla español, tiene a cortez disposición servicios gratuitos de asistencia lingüística. Llame al [phone].    We comply with applicable federal civil rights laws and [place] laws. We do not discriminate on the basis of race, color, national origin, age, disability, sex, sexual orientation, or gender identity.            Thank you!     Thank you for choosing [facility]  for your care. Our goal is always to provide you with excellent care. Hearing back from our patients is one way we can continue to improve our services. Please take a few minutes to complete the written survey that you may receive in the mail after your visit with us. Thank you!             Your Updated Medication List - Protect others around you: Learn how to safely use, store and throw away your medicines at www.disposemymeds.org.          This list is accurate as of: 1/11/18  2:41 PM.  Always use your most recent med list.                   Brand Name Dispense Instructions for use Diagnosis    * levETIRAcetam 1000 MG Tabs      Take 1,000 mg by mouth    Spells of decreased attentiveness       * levETIRAcetam 750 MG tablet    KEPPRA    120 tablet    Titrate to 1500 mg twice a day    Spells of decreased  attentiveness       LORazepam 2 MG tablet    ATIVAN    2 tablet    MRI brain take ativan 2 mg tablet 45 minutes before MRI brain. If not sleepy after 20 minutes. Take ativan another 2 mg tablet again.    Spells of decreased attentiveness       * Notice:  This list has 2 medication(s) that are the same as other medications prescribed for you. Read the directions carefully, and ask your doctor or other care provider to review them with you.

## 2018-01-11 NOTE — PROGRESS NOTES
Received return call from Geovanni.  Geovanni has not watched the DVD. He reports he will watch the DVD prior to admission.  At the time of this call he did not have any additional questions.    PLAN:  Watch the DVD regarding inpatient admission.  Call if questions after watching the DVD  Arrive to [facility]  1/15/18 by 9:15 AM

## 2018-01-15 ENCOUNTER — ALLIED HEALTH/NURSE VISIT (OUTPATIENT)
Dept: NEUROLOGY | Facility: CLINIC | Age: 28
DRG: 101 | End: 2018-01-15
Attending: PSYCHIATRY & NEUROLOGY
Payer: COMMERCIAL

## 2018-01-15 ENCOUNTER — HOSPITAL ENCOUNTER (INPATIENT)
Facility: CLINIC | Age: 28
LOS: 1 days | Discharge: LEFT BEFORE DISCHARGE | DRG: 101 | End: 2018-01-15
Attending: PSYCHIATRY & NEUROLOGY | Admitting: PSYCHIATRY & NEUROLOGY
Payer: COMMERCIAL

## 2018-01-15 VITALS
DIASTOLIC BLOOD PRESSURE: 56 MMHG | TEMPERATURE: 97.8 F | HEIGHT: 70 IN | WEIGHT: 126.9 LBS | BODY MASS INDEX: 18.17 KG/M2 | HEART RATE: 82 BPM | SYSTOLIC BLOOD PRESSURE: 100 MMHG | OXYGEN SATURATION: 100 % | RESPIRATION RATE: 16 BRPM

## 2018-01-15 DIAGNOSIS — R68.89 SPELLS OF DECREASED ATTENTIVENESS: Primary | ICD-10-CM

## 2018-01-15 PROBLEM — R56.9 SEIZURE (H): Status: ACTIVE | Noted: 2018-01-15

## 2018-01-15 LAB
ALBUMIN SERPL-MCNC: 3.7 G/DL (ref 3.4–5)
ALP SERPL-CCNC: 64 U/L (ref 40–150)
ALT SERPL W P-5'-P-CCNC: 15 U/L (ref 0–70)
ANION GAP SERPL CALCULATED.3IONS-SCNC: 7 MMOL/L (ref 3–14)
AST SERPL W P-5'-P-CCNC: 13 U/L (ref 0–45)
BILIRUB SERPL-MCNC: 1.4 MG/DL (ref 0.2–1.3)
BUN SERPL-MCNC: 9 MG/DL (ref 7–30)
CALCIUM SERPL-MCNC: 8.8 MG/DL (ref 8.5–10.1)
CHLORIDE SERPL-SCNC: 106 MMOL/L (ref 94–109)
CO2 SERPL-SCNC: 27 MMOL/L (ref 20–32)
CREAT SERPL-MCNC: 0.78 MG/DL (ref 0.66–1.25)
ERYTHROCYTE [DISTWIDTH] IN BLOOD BY AUTOMATED COUNT: 12 % (ref 10–15)
GFR SERPL CREATININE-BSD FRML MDRD: >90 ML/MIN/1.7M2
GLUCOSE SERPL-MCNC: 80 MG/DL (ref 70–99)
HCT VFR BLD AUTO: 40.7 % (ref 40–53)
HGB BLD-MCNC: 13.8 G/DL (ref 13.3–17.7)
MCH RBC QN AUTO: 32.9 PG (ref 26.5–33)
MCHC RBC AUTO-ENTMCNC: 33.9 G/DL (ref 31.5–36.5)
MCV RBC AUTO: 97 FL (ref 78–100)
PLATELET # BLD AUTO: 227 10E9/L (ref 150–450)
POTASSIUM SERPL-SCNC: 3.8 MMOL/L (ref 3.4–5.3)
PROT SERPL-MCNC: 7.3 G/DL (ref 6.8–8.8)
RBC # BLD AUTO: 4.2 10E12/L (ref 4.4–5.9)
SODIUM SERPL-SCNC: 140 MMOL/L (ref 133–144)
WBC # BLD AUTO: 5.8 10E9/L (ref 4–11)

## 2018-01-15 PROCEDURE — 36415 COLL VENOUS BLD VENIPUNCTURE: CPT | Performed by: PSYCHIATRY & NEUROLOGY

## 2018-01-15 PROCEDURE — 25000128 H RX IP 250 OP 636: Performed by: STUDENT IN AN ORGANIZED HEALTH CARE EDUCATION/TRAINING PROGRAM

## 2018-01-15 PROCEDURE — 95951 ZZHC EEG VIDEO EACH 24 HR: CPT | Mod: ZF

## 2018-01-15 PROCEDURE — 80177 DRUG SCRN QUAN LEVETIRACETAM: CPT | Performed by: PSYCHIATRY & NEUROLOGY

## 2018-01-15 PROCEDURE — 85027 COMPLETE CBC AUTOMATED: CPT | Performed by: PSYCHIATRY & NEUROLOGY

## 2018-01-15 PROCEDURE — 25000132 ZZH RX MED GY IP 250 OP 250 PS 637: Performed by: STUDENT IN AN ORGANIZED HEALTH CARE EDUCATION/TRAINING PROGRAM

## 2018-01-15 PROCEDURE — 80053 COMPREHEN METABOLIC PANEL: CPT | Performed by: PSYCHIATRY & NEUROLOGY

## 2018-01-15 PROCEDURE — 12000001 ZZH R&B MED SURG/OB UMMC

## 2018-01-15 PROCEDURE — 40000141 ZZH STATISTIC PERIPHERAL IV START W/O US GUIDANCE

## 2018-01-15 RX ORDER — LIDOCAINE 40 MG/G
CREAM TOPICAL
Status: DISCONTINUED | OUTPATIENT
Start: 2018-01-15 | End: 2018-01-16 | Stop reason: HOSPADM

## 2018-01-15 RX ORDER — LEVETIRACETAM 500 MG/1
500 TABLET ORAL 2 TIMES DAILY
Status: DISCONTINUED | OUTPATIENT
Start: 2018-01-15 | End: 2018-01-15

## 2018-01-15 RX ORDER — LORAZEPAM 2 MG/ML
2 INJECTION INTRAMUSCULAR
Status: DISCONTINUED | OUTPATIENT
Start: 2018-01-15 | End: 2018-01-16 | Stop reason: HOSPADM

## 2018-01-15 RX ORDER — ACETAMINOPHEN 325 MG/1
650 TABLET ORAL EVERY 4 HOURS PRN
Status: DISCONTINUED | OUTPATIENT
Start: 2018-01-15 | End: 2018-01-16 | Stop reason: HOSPADM

## 2018-01-15 RX ORDER — LEVETIRACETAM 750 MG/1
1500 TABLET ORAL 2 TIMES DAILY
Status: DISCONTINUED | OUTPATIENT
Start: 2018-01-15 | End: 2018-01-15

## 2018-01-15 RX ORDER — DOCUSATE SODIUM 100 MG/1
100 CAPSULE, LIQUID FILLED ORAL 2 TIMES DAILY PRN
Status: DISCONTINUED | OUTPATIENT
Start: 2018-01-15 | End: 2018-01-16 | Stop reason: HOSPADM

## 2018-01-15 RX ORDER — LEVETIRACETAM 500 MG/1
500 TABLET ORAL 2 TIMES DAILY
Status: DISCONTINUED | OUTPATIENT
Start: 2018-01-15 | End: 2018-01-16 | Stop reason: HOSPADM

## 2018-01-15 RX ADMIN — SODIUM CHLORIDE 2000 MG: 900 INJECTION, SOLUTION INTRAVENOUS at 22:25

## 2018-01-15 RX ADMIN — LEVETIRACETAM 500 MG: 500 TABLET ORAL at 20:00

## 2018-01-15 RX ADMIN — LEVETIRACETAM 1750 MG: 750 TABLET, FILM COATED ORAL at 14:53

## 2018-01-15 ASSESSMENT — ACTIVITIES OF DAILY LIVING (ADL)
TRANSFERRING: 0-->INDEPENDENT
RETIRED_COMMUNICATION: 0-->UNDERSTANDS/COMMUNICATES WITHOUT DIFFICULTY
FALL_HISTORY_WITHIN_LAST_SIX_MONTHS: YES
COGNITION: 0 - NO COGNITION ISSUES REPORTED
BATHING: 0-->INDEPENDENT
RETIRED_EATING: 0-->INDEPENDENT
SWALLOWING: 0-->SWALLOWS FOODS/LIQUIDS WITHOUT DIFFICULTY
DRESS: 0-->INDEPENDENT
TOILETING: 0-->INDEPENDENT
AMBULATION: 0-->INDEPENDENT

## 2018-01-15 NOTE — IP AVS SNAPSHOT
MRN:[id number]                      After Visit Summary   1/15/2018    Geovanni Mullen    MRN: [id number]           Thank you!     Thank you for choosing [facility] for your care. Our goal is always to provide you with excellent care. Hearing back from our patients is one way we can continue to improve our services. Please take a few minutes to complete the written survey that you may receive in the mail after you visit with us. Thank you!        Patient Information     Date Of Birth          1990        Designated Caregiver       Most Recent Value    Caregiver    Will someone help with your care after discharge? yes    Name of designated caregiver Sergo    Phone number of caregiver [phone]    Caregiver address same as patient      About your hospital stay     You were admitted on:  January 15, 2018 You last received care in the:  [address] [facility] [place]    You were discharged on:  January 15, 2018        Reason for your hospital stay       You were hospitalized for EEG monitoring due to seizures since stopping Keppra.                  Who to Call     For medical emergencies, please call 911.  For non-urgent questions about your medical care, please call your primary care provider or clinic, None          Attending Provider     Provider Specialty    Yossi Freire MD Neurology       Primary Care Provider Fax #    Physician No Ref-Primary [phone]      After Care Instructions     Activity       Your activity upon discharge: Do not drive or perform activities which would put you or others in danger if you were to lose control of your body for at least 3 months.  This includes being near open flames or hot water.  It includes taking baths and working at heights.  Be careful not to hold your child in a way that would put it in danger during one of your seizures for at least 3 months.            Diet       Follow this diet upon discharge: Orders Placed This Encounter      Regular  "Diet Adult            Monitor and record       Seizures                  Follow-up Appointments     Adult [facility] Follow-up and recommended labs and tests       Follow up with Dr. Baxter , in epilepsy clinic, next available  to evaluate medication change. No follow up labs or test are needed.    Appointments on [place] and/or [facility] (with [facility] or [facility] provider or service). Call [phone] if you haven't heard regarding these appointments within 7 days of discharge.                  Your next 10 appointments already scheduled     Jan 16, 2018  7:00 AM CST   24 Hour Video Visit with [facility] EEG TECH 4   [facility] EEG ([facility])    [address] [address] [phone]           [place]: Your appointment is scheduled at [facility]. [address] [place] [phone]              Pending Results     Date and Time Order Name Status Description    1/15/2018 1005 Keppra (Levetiracetam) Level In process             Admission Information     Date & Time Provider Department Dept. Phone    1/15/2018 Yossi Freire MD [address] [facility] [phone]      Your Vitals Were     Blood Pressure Pulse Temperature Respirations Height Weight    100/56 82 97.8  F (36.6  C) (Oral) 16 1.778 m (5' 10\") 57.6 kg (126 lb 14.4 oz)    Pulse Oximetry BMI (Body Mass Index)                100% 18.21 kg/m2          [employer] Information     [employer] lets you send messages to your doctor, view your test results, renew your prescriptions, schedule appointments and more. To sign up, go to [email or web address] . Click on \"Log in\" on the left side of the screen, which will take you to the Welcome page. Then click on \"Sign up Now\" on the right side of the page.     You will be asked to enter the access code listed below, as well as some personal information. Please follow the directions to create your username and " password.     Your access code is: [email or web address]  Expires: 2018 11:14 AM     Your access code will  in 90 days. If you need help or a new code, please call your [place] clinic or [phone].        Care EveryWhere ID     This is your Care EveryWhere ID. This could be used by other organizations to access your [place] medical records  [id number]        Equal Access to Services     ANIL CROOKS : Hadii aad ku hadasho Soomaali, waaxda luqadaha, qaybta kaalmada adeegyada, waxay idiin hayaan adeeg susansh latamia . So [facility] [phone].    ATENCIÓN: Si leonard lozano, tiene a cortez disposición servicios gratuitos de asistencia lingüística. Llame al [phone].    We comply with applicable federal civil rights laws and [place] laws. We do not discriminate on the basis of race, color, national origin, age, disability, sex, sexual orientation, or gender identity.               Review of your medicines      CONTINUE these medicines which may have CHANGED, or have new prescriptions. If we are uncertain of the size of tablets/capsules you have at home, strength may be listed as something that might have changed.        Dose / Directions    levETIRAcetam 750 MG tablet   Commonly known as:  KEPPRA   This may have changed:  Another medication with the same name was removed. Continue taking this medication, and follow the directions you see here.   Used for:  Spells of decreased attentiveness        Titrate to 1500 mg twice a day   Quantity:  120 tablet   Refills:  3         STOP taking     LORazepam 2 MG tablet   Commonly known as:  ATIVAN                    Protect others around you: Learn how to safely use, store and throw away your medicines at www.disposemymeds.org.             Medication List: This is a list of all your medications and when to take them. Check marks below indicate your daily home schedule. Keep this list as a reference.      Medications           Morning Afternoon Evening Bedtime As Needed     levETIRAcetam 750 MG tablet   Commonly known as:  KEPPRA   Titrate to 1500 mg twice a day   Last time this was given:  500 mg on 1/15/2018  8:00 PM

## 2018-01-15 NOTE — IP AVS SNAPSHOT
[address] [address]    Phone:  [phone]                                       After Visit Summary   1/15/2018    Geovanni Mullen    MRN: [id number]           After Visit Summary Signature Page     I have received my discharge instructions, and my questions have been answered. I have discussed any challenges I see with this plan with the nurse or doctor.    ..........................................................................................................................................  Patient/Patient Representative Signature      ..........................................................................................................................................  Patient Representative Print Name and Relationship to Patient    ..................................................               ................................................  Date                                            Time    ..........................................................................................................................................  Reviewed by Signature/Title    ...................................................              ..............................................  Date                                                            Time

## 2018-01-15 NOTE — MR AVS SNAPSHOT
After Visit Summary   1/15/2018    Geovanni Mullen    MRN: [id number]           Patient Information     Date Of Birth          1990        Visit Information        Provider Department      1/15/2018 10:00 AM [facility] EEG TECH 4 [facility] EEG        Today's Diagnoses     Spells of decreased attentiveness    -  1       Follow-ups after your visit        Who to contact     Please call your clinic at [phone] to:    Ask questions about your health    Make or cancel appointments    Discuss your medicines    Learn about your test results    Speak to your doctor   If you have compliments or concerns about an experience at your clinic, or if you wish to file a complaint, please contact [facility] Patient Relations at [phone] or email us at [email or web address]         Additional Information About Your Visit        MyChart Information     [employer] is an electronic gateway that provides easy, online access to your medical records. With [employer]igl, you can request a clinic appointment, read your test results, renew a prescription or communicate with your care team.     To sign up for Em[employer] visit the website at [email or web address]   You will be asked to enter the access code listed below, as well as some personal information. Please follow the directions to create your username and password.     Your access code is: [email or web address]  Expires: 2018 11:14 AM     Your access code will  in 90 days. If you need help or a new code, please contact your [facility] or call [phone] for assistance.        Care EveryWhere ID     This is your Care EveryWhere ID. This could be used by other organizations to access your [place] medical records  [id number]         Blood Pressure from Last 3 Encounters:   01/15/18 110/68   17 116/71    Weight from Last 3 Encounters:   01/15/18 57.6 kg (126 lb 14.4 oz)   17 59.7 kg (131 lb 9.6 oz)               Today, you had the following     No orders found for display         Today's Medication Changes      Notice     This visit is during an admission. Changes to the med list made in this visit will be reflected in the After Visit Summary of the admission.             Primary Care Provider Fax #    Physician No Ref-Primary [phone]       No address on file        Equal Access to Services     ANIL CROOKS : Мария haleigh day hadjasono Socadyali, waaxda luqadaha, qaybta kaalmada ademercyalexandravj, waxay idiin haylaurenkota wilkinsondorianbeatriz franklin . So [facility] [phone].    ATENCIÓN: Si habla español, tiene a cortez disposición servicios gratuitos de asistencia lingüística. Llame al [phone].    We comply with applicable federal civil rights laws and [place] laws. We do not discriminate on the basis of race, color, national origin, age, disability, sex, sexual orientation, or gender identity.            Thank you!     Thank you for choosing [facility]  for your care. Our goal is always to provide you with excellent care. Hearing back from our patients is one way we can continue to improve our services. Please take a few minutes to complete the written survey that you may receive in the mail after your visit with us. Thank you!             Your Updated Medication List - Protect others around you: Learn how to safely use, store and throw away your medicines at www.disposemymeds.org.      Notice     This visit is during an admission. Changes to the med list made in this visit will be reflected in the After Visit Summary of the admission.

## 2018-01-15 NOTE — PLAN OF CARE
Problem: Seizure Disorder/Epilepsy (Adult)  Goal: Signs and Symptoms of Listed Potential Problems Will be Absent, Minimized or Managed (Seizure Disorder/Epilepsy)  Signs and symptoms of listed potential problems will be absent, minimized or managed by discharge/transition of care (reference Seizure Disorder/Epilepsy (Adult) CPG).  Outcome: No Change  Pt new admit for seizure characterization. VEEG leads intact. Two events this afternoon. See notes. Patient reminded to press button if able. MD [place] notified. Keppra 1750mg given stat then scheduled dose to start this evening. AOX4, VSS, Neuros intact. Up SBA, GB and walker. PIV SL'ed. Tolerating PO. Patient compliant w precautions. Girlfriend took patients PTA Keppra home. C/t monitor.

## 2018-01-15 NOTE — H&P
"                          [facility] Epilepsy Admission     Geovanni Mullen MRN# [id number]   YOB: 1990 Age: 27 year old   Primary Epileptologist: Sirisha Baxter MD  Referring Provider: Sirisha Baxter MD     Reason for Admission: Video EEG monitoring for characterization of spells of altered consciousness with limb movements    HPI:   Mr Mullen is a 26yo gentleman with no significant medical history who presents to [facility] for planned admission for characterization of seizure-like episodes (detailed below). His girlfriend is present and provides a significant portion of the history. Per Mr Mullen and his girlfriend, he first started having seizure-like activity in early 2016. His initial spells were described as \"anxiety attacks\" during which he became lightheaded, diaphoretic, dizzy, hyperventilating, and experienced radha vu. These spells were brief, although they occasionally progressed to unresponsiveness with lip smacking and grabbing movements made with his right hand. He was initially treated for anxiety without significant relief of these episodes. He denies significant underlying anxiety or other symptoms of mental illness. In July of 2016, he had his first GTC. His girlfriend states that he was in bed when she noted his whole body become stiff followed by rhythmic shaking and bloody froth coming from his mouth. His eyes were rolled back. This episode lasted a few minutes. At this point, he was started on divalproex, which did not seem to reduce the spells, prompting his being switched to levetiracetam. Since starting levetiracetam, he has noted a reduction in the number of spells, however, they are still occurring 1-3 times per month. He usually has a GTC on the same day as one of the more minor episodes. His girlfriend describes an aura of headache, dizziness, and lightheadedness. He and his girlfriend agree that sleep deprivation and missed doses of levetiracetam are the most reliable triggers for his " "spells. He admits to staying up late playing video games, sometimes until 2-3 in the morning, however, he then needs to be up at 6AM to care for his children.     Mr Mullen denies use of alcohol and recreational drugs such as methamphetamine, cocaine, and K2. He occasionally uses marijuana. He denies abuse of prescription medications, such as benzodiazepines. His grandmother has seizures, but he believes they started later in life after she had a stroke. He denies history of childhood seizures, birth trauma, CNS infection, TBI, and learning difficulties.    Types of Seizures:  Type I: Spell of lightheadedness, dizziness, headache, tachypnea, palpitations, diaphoresis, and radha vu. These spells are brief and last approximately one minute. Occasionally they progress to Type II (below). These spells started in early 2016 and have improved significantly since he was started on levetiracetam. He states that he rarely has these spells anymore.    Type II: The second type of spell starts with a Type I event but persists until he becomes unresponsive with lip smacking and grabbing movements of his right hand. These spells are brief, usually lasting ~1 minute, although he is confused afterwards. These spells started shortly after his Type I spells began and similarly have reduced in frequency since he started levetiracetam. He estimates that these occur 1-3 times per month.    Type III: These spells are GTCs. First observed by his girlfriend in July 2016, she states that his GTCs usually \"cluster\" with the first two event types. She states that if he has a Type I or II event, then he will likely have a GTC later that day or night. She identifies sleep deprivation as a trigger for these events. These have been occurring 1-3 times per month since onset. He often bites his tongue during these events, which he reports is quite painful.    Triggers: sleep deprivation, missed AED doses    Past Epileptic Drugs: divalproex (stopped " "due to poor efficacy and side effects of \"feeling weird all over\"), levetiracetam (current, well tolerated)    Risk Factors: Grandmother with seizures that started later in life after a stroke, otherwise no family history of epilepsy. Denies use of alcohol, methamphetamine, cocaine, K2, and benzodiazepines. No known history of intracranial hemorrhage or mass lesions. No known history of missed milestones, learning disabilities, or psychiatric disorders. No known history of childhood seizures, febrile seizures, or CNS infections. Had one minor TBI as a child during which he ran into a tree, but is unsure if he lost consciousness.    Prior Epilepsy Work Up:   EEG 11/7: Copied directly from report by Dr Baxter to ensure accuracy  \"IMPRESSION OF VIDEO EEG: This is a normal awake and sleep video electroencephalogram. No electrographic seizures or epileptiform discharges were recorded. \"    Past Medical History:   Denies prior medical history    Past Surgical History:  \"Eye\" surgery    Family History:  No family history of epilepsy. His grandmother has seizures that started after a stroke.    Social History:  Social History     Social History     Marital status: Single     Spouse name: N/A     Number of children: 5     Years of education: Completed high school     Occupational History     Currently unemployed due to seizures. Previously worked in various temp jobs.     Social History Main Topics     Smoking status: Current Every Day Smoker     Types: Cigarettes     Smokeless tobacco: Never Used     Alcohol use Denies     Drug use: Regular marijuana use, otherwise denies     Sexual activity: Not discussed     Other Topics Concern     Not on file     Social History Narrative     No narrative on file       Medications:   Levetiracetam 1000mg BID    Allergies:   NKDA    Review of Systems:   Positive for seizures. Denies cough, SOB, chest pain and N/V/D/C. The rest of the 10 point review of systems negative except per " "HPI    Physical Exam/Vitals:  Blood pressure 99/62, pulse 80, temperature 96.3  F (35.7  C), temperature source Oral, resp. rate 16, height 1.778 m (5' 10\"), weight 57.6 kg (126 lb 14.4 oz), SpO2 100 %.  Neurologic:  - alert and oriented to person, place, time, and situation; language appropriate, follows commands  - visual fields intact by confrontation; pupils 3mm symmetric, round and reactive to light; EOMI without nystagmus  - no facial asymmetry; tongue movements full; palate rise symmetric; no dysarthria  - sensation grossly intact to light touch throughout  - muscle tone and bulk symmetric and appropriate; strength 5/5 throughout  - DTR 2+ and symmetric at biceps, brachioradialis, patellar, and Achilles sites BL  - finger-nose-finger and heel-shin intact BL    Constitutional: NAD, WDWN, cooperative with examination and interview  Psych: insight and affect appropriate to circumstance  Cardiovascular: regular rhythm without murmurs, pulses full and symmetric at radial and dorsal pedal arteries  Respiratory: clear to auscultation without wheezes or rales  Chest: symmetric chest rise, no accessory muscle use  Abdominal: BS normal active x 4  Extremities: no clubbing of digits, no pitting edema  Skin: No lesions or rashes  Data:  Results for orders placed or performed during the hospital encounter of 01/15/18 (from the past 24 hour(s))   CBC with platelets   Result Value Ref Range    WBC 5.8 4.0 - 11.0 10e9/L    RBC Count 4.20 (L) 4.4 - 5.9 10e12/L    Hemoglobin 13.8 13.3 - 17.7 g/dL    Hematocrit 40.7 40.0 - 53.0 %    MCV 97 78 - 100 fl    MCH 32.9 26.5 - 33.0 pg    MCHC 33.9 31.5 - 36.5 g/dL    RDW 12.0 10.0 - 15.0 %    Platelet Count 227 150 - 450 10e9/L   Comprehensive metabolic panel   Result Value Ref Range    Sodium 140 133 - 144 mmol/L    Potassium 3.8 3.4 - 5.3 mmol/L    Chloride 106 94 - 109 mmol/L    Carbon Dioxide 27 20 - 32 mmol/L    Anion Gap 7 3 - 14 mmol/L    Glucose 80 70 - 99 mg/dL    Urea " Nitrogen 9 7 - 30 mg/dL    Creatinine 0.78 0.66 - 1.25 mg/dL    GFR Estimate >90 >60 mL/min/1.7m2    GFR Estimate If Black >90 >60 mL/min/1.7m2    Calcium 8.8 8.5 - 10.1 mg/dL    Bilirubin Total 1.4 (H) 0.2 - 1.3 mg/dL    Albumin 3.7 3.4 - 5.0 g/dL    Protein Total 7.3 6.8 - 8.8 g/dL    Alkaline Phosphatase 64 40 - 150 U/L    ALT 15 0 - 70 U/L    AST 13 0 - 45 U/L     Urine Pregnancy Test: Not indicated, the patient is a male    Current AEDs:   Levetiracetam 1000mg BID (different than recent clinic note, however, the patient confirms this as his current dose)    Assessment and Plan:   Mr Mullen is a healthy 28yo gentleman admitted for prolonged video EEG to further characterize spells of lost consciousness concerning for epilepsy.    #complex partial seizure disorder: Two events occurred within the first few hours of recording with EEG findings consistent with complex partial seizures. AED load given. Will plan for further monitoring.  - levetiracetam 1750mg STAT once  - levetiracetam 500mg BID  - Admit for vEEG monitoring  - Ativan PRN seizures per protocol  - Seizure precautions  - SCD's for DVT prophylaxis  - Ambulate with nursing at least 2-3 times a day    #FEN: PO intake adequate, no IVF, regular diet  #PPx: SCD    Disposition: Anticipate at least a few days of recording to capture an adequate number of events and control seizure activity.    This patient was seen and discussed with staff epileptologist, Dr Tani Gamboa, DO  PGY3 [facility]      NEUROLOGY/EPILEPSY ATTENDING NOTE    I personally interviewed and examined patient and agree with above. Additional time in coordination of cares beyond time spent by resident staff was approximately 25 minutes.    Good story for complex partial seizures, occasional secondarily generalized tonic clonic seizures, further supported by apparent response to levetiracetam. Need imaging. Need to confirm compliance. Seizure burden is high  and at first glance he appears to be motivated in getting seizures controlled, participating more actively in care of his children, and working. May be candidate for resective epilepsy surgery if truly refractory to medications and if he can cooperate with the evaluation.    Yossi Freire MD

## 2018-01-16 LAB — LEVETIRACETAM SERPL-MCNC: 14 UG/ML (ref 12–46)

## 2018-01-16 NOTE — PROCEDURES
EEG #-1      DATE OF STUDY:  01/15/2018.      TYPE OF STUDY:  Inpatient video EEG monitoring.      DURATION OF RECORDING:  10 hours, 22 minutes and 42 seconds.      HISTORY:  Day one of a video EEG monitoring procedure on Geovanni Mullen.  Mr. Mullen is a 27-year-old with a history of complex partial epilepsy who is being admitted for characterization of spells.  On the day of recording, the patient was on levetiracetam 1500 mg BID.       TECHNICAL SUMMARY:  The continuous EEG monitoring procedure was performed with 23 scalp electrodes in the 10-20 system placement.  Additional scalp, precordial and other surface electrodes were used for electrical referencing and artifact detection.  Video monitoring was utilized and periodically reviewed by the EEG technologist and the physician for electroclinical correlation.      FINDINGS:  While awake the patient had a 9 Hz posterior dominant rhythm, which was symmetric and reactive to eye opening.  During drowsiness, this posterior dominant rhythm dropped out.  During sleep, the patient had symmetric vertex wave sleep spindles and K complexes.  Hyperventilation and intermittent photic stimulation were not performed.  The patient had intermittent left temporal slowing.        INTERICTAL ABNORMALITIES:  The patient had occasional left temporal epileptiform discharges.  The discharges had maximal negativity in the T3 and F7 electrodes.      ICTAL ABNORMALITIES:  The patient had 2 seizures.  Both seizures arose out of sleep.    1. During the first seizure, the patient was asleep on his left side.  The frst clinical change was at 13:58:26 when the patient's eyes opened.  He closed his eyes 6 seconds later, but subsequently turned his head to the right and had grasping hand automatisms in his right hand.  He grasped  with a blanket for several seconds, stopped, and then grasped another blanket with his arms flexed.  When the nurse entered his room, the patient is unable to  "answer questions.  The nurse asked if the patient was okay, he said \"Umm\" and then leaned forward towards the nurse.  He was unable to follow commands, but was able to follow the nurse with his gaze.  He does sit up at 14:00:56 and is cooperative in having his blood pressure checked.  By 14:02:00 the patient starts responding to questions with \"I don't know.\"   Response was delayed when asked his name, location and the date.  After the end of the seizure, the patient is able to name an object and follow commands, but he does still does not recall his name.  The onset of the seizure is best seen in a CZ referential montage.  There is rhythmic theta frequency activity with onset in the left anterior temporal (T1, F7 and T3 electrodes).  This activity increases in amplitude and decreases in frequency as it evolves.  By 13:59:02, there appears to be generalized rhythmic delta to theta frequency activity which continues for the  remainder of the seizure; particularly in the left hemisphere there are more sharply-contoured waves in the left temporal region.  The seizure appears to end at 14:02:51.   There does appear to be attenuation of the background activity diffusely.    2. The second seizure also occurs out of sleep.  The patient is lying in bed on his right side.  He appears to straighten his body at 14:27:47, he lifts his head at 14:28:27 and approximately 15 seconds later, he has some flexion in bilateral legs and right hand movements can be seen.  He appears to be a movement where he is rubbing his thumb against his pulse oximeter.   He has a tremor as well that varies in amplitude and frequency. The technician enters the room, gives a cue word, which is not recalled later.  The patient is unable to name an object.  At 14:29:26, he puts the finger with his pulse oximeter in his mouth but follows directions to remove it.  He fidgets in the bed, readjusting his pillow and his body.  He is able to read a word at " "14:29:47.  He responds generally to questions.  When asked what his location is, the patient says \"in bed.\"  He has difficulty stating what type of building he is in, but was able to choose it from 1 of 2 options.  He does name an object and is able to recall his last name.  Technician notes that he is diaphoretic.  He does not know the date or the current president.  Electrographically, the onset appears to be slightly more diffuse, again on the CZ referential montage.  Rhythmic theta frequency activity can be seen in the left temporal region.  This activity can be seen in the T1, F7, T3, FP1 and F3 electrodes.  The EEG progresses similar to the first seizure with decreasing frequency activity and slightly increasing amplitude.  The end of the seizure is less  clear due to some scratching and movement artifacts, but a symmetric posterior dominant rhythm and normal background activity can be seen by 14:30:52.      IMPRESSION:  Abnormal. There is evidence of focal cerebral dysfunction and focal cortical irritability in the left anterior temporal region.  The patient had 2 clinical seizures with apparent left temporal onset, occasional left temporal epileptiform discharges which were activated during sleep, and intermittent left temporal slowing. .  These findings confirm that patient suffers from focal epilepsy with focal impaired (complex partial) seizures, and suggest that these seizures may emerge from the left temporal region.     I personally reviewed this study and report and ammended dictation as appropriate.     ANGELICA BOTELLO MD       As dictated by OSVALDO BLACKMON MD   Clinical Neurophysiology Fellow             D: 2018 12:19   T: 2018 13:20   MT: NTS      Name:     MELANIE HUMPHREY   MRN:      [id number]        Account:        [id number]   :      1990           Procedure Date: 01/15/2018      Document: [id number]      "

## 2018-01-16 NOTE — DISCHARGE SUMMARY
[facility], [place]    Neurology Discharge Summary    Date of Admission: 1/15/2018  Date of Discharge: January 16, 2018    Disposition: Discharged to home  Primary Care Physician: No Ref-Primary, Physician     Admission Diagnosis:   Seizures    Discharge Diagnosis:   Seizures    Problem Leading to Hospitalization (from [facility]):   Mr. Mullen is a 27 year old man who was admitted the morning of 1/15 for planned characterization of spells. Further description of his spells is included in the H&P.  He had last seen Dr. Baxter in November 2017 at which time his Keppra was increased to 1500 BID, but he reports he never increased the dose.  His prior EEG in November showed normal wake and sleep vEEG without electrographic seizures or epileptiform discharges.  He stopped taking Keppra the day leading up to admission.    Please see H&P dated for further details about presentation.    Brief Hospital Course:   He was placed on vEEG and had two events within the first few hours.  These events were consistent with complex partial seizures on EEG.  He was given a 1750 mg Keppra load and started on Keppra 500 mg BID(decreased from home dose) with plan to try to capture more seizures.  No further seizures were recorded but he had occasional left temporal epileptiform discharged and left temporal slowing. The evening of 1/15 he became restless and stated that he wanted to go home, citing that he did not have his maxwell console.  He was informed of the risks of discharging with lower anticonvulsant levels and agreed to another load of Keppra.  He was given 2000 mg Keppra IV and discharged on the previously planned dose of 1500 mg BID.  He was told to start taking this dose the morning after discharge.  He had not yet filled the 1500 BID prescription but stated he would do so the next morning.  He agreed to schedule next available follow up in epilepsy clinic.  He was discharged AMA the evening of  1/15.      PERTINENT INVESTIGATIONS    Labs  Lab Results   Component Value Date    WBC 5.8 01/15/2018     Lab Results   Component Value Date    RBC 4.20 01/15/2018     Lab Results   Component Value Date    HGB 13.8 01/15/2018     Lab Results   Component Value Date    HCT 40.7 01/15/2018     No components found for: MCT  Lab Results   Component Value Date    MCV 97 01/15/2018     Lab Results   Component Value Date    MCH 32.9 01/15/2018     Lab Results   Component Value Date    MCHC 33.9 01/15/2018     Lab Results   Component Value Date    RDW 12.0 01/15/2018     Lab Results   Component Value Date     01/15/2018     Last Basic Metabolic Panel:  Lab Results   Component Value Date     01/15/2018      Lab Results   Component Value Date    POTASSIUM 3.8 01/15/2018     Lab Results   Component Value Date    CHLORIDE 106 01/15/2018     Lab Results   Component Value Date    NINI 8.8 01/15/2018     Lab Results   Component Value Date    CO2 27 01/15/2018     Lab Results   Component Value Date    BUN 9 01/15/2018     Lab Results   Component Value Date    CR 0.78 01/15/2018     Lab Results   Component Value Date    GLC 80 01/15/2018         Imaging: None performed this admission    EEG(Preliminary Read):  Abnormal. There is evidence of focal cerebral dysfunction and cortical irritability in the left anterior temporal region.  The patient had 2 clinical seizures with apparent left temporal onset, occasional left temporal epileptiform discharges which were activated during sleep, and intermittent left temporal slowing. .  These findings are consistent with the patient's diagnosis of complex partial epilepsy, and suggest that these seizures may emerge from the left temporal region.       PHYSICAL EXAMINATION  Constitutional : well-built  Head: atraumatic, anicteric. See neuroexam  Eyes: see neuroexam  CVC: RRR no murmurs or extra heart sounds  LUng: CTAB  Adomen: Nondistended, bowel sounds present  Extremities: No edema  noted  Psychiatry: Restless, cooperative.  Neuroexam  Alert and cooperative.  Follows commands appropriately  EOMI, visual fields full.  Facial sensation and movement symmetric.  Palatal elevation symmetric.  Tongue protrusion midline.  No dysmetria (arms and legs tested)  Strength 5/5 upper and lower extremities bilaterally  Reflexes 2+ symmetric in biceps, brachioradialis, patellar, and achilles  Sensory exam to light touch: preserved   No aphasia  No dysarthria    Additional recommendations and follow up:       Review of your medicines      CONTINUE these medicines which may have CHANGED, or have new prescriptions. If we are uncertain of the size of tablets/capsules you have at home, strength may be listed as something that might have changed.       Dose / Directions    levETIRAcetam 750 MG tablet   Commonly known as:  KEPPRA   This may have changed:  Another medication with the same name was removed. Continue taking this medication, and follow the directions you see here.   Used for:  Spells of decreased attentiveness        Titrate to 1500 mg twice a day   Quantity:  120 tablet   Refills:  3         STOP taking          LORazepam 2 MG tablet   Commonly known as:  ATIVAN                 Reason for your hospital stay   You were hospitalized for EEG monitoring due to seizures since stopping Keppra.     Adult [facility] Follow-up and recommended labs and tests   Follow up with Dr. Baxter , in epilepsy clinic, next available  to evaluate medication change. No follow up labs or test are needed.    Appointments on [place] and/or [facility] (with [facility] or [facility] provider or service). Call [phone] if you haven't heard regarding these appointments within 7 days of discharge.     Activity   Your activity upon discharge: Do not drive or perform activities which would put you or others in danger if you were to lose control of your body for at least 3 months.  This includes being near open flames or hot water.  It  includes taking baths and working at heights.  Be careful not to hold your child in a way that would put it in danger during one of your seizures for at least 3 months.     Monitor and record   Seizures     Full Code     Diet   Follow this diet upon discharge: Orders Placed This Encounter     Regular Diet Adult         PAtient was seen and discussed with Dr. Tani Cisneros  PGY2 Neurology  [facility]

## 2018-01-16 NOTE — PLAN OF CARE
Problem: Seizure Disorder/Epilepsy (Adult)  Goal: Signs and Symptoms of Listed Potential Problems Will be Absent, Minimized or Managed (Seizure Disorder/Epilepsy)  Signs and symptoms of listed potential problems will be absent, minimized or managed by discharge/transition of care (reference Seizure Disorder/Epilepsy (Adult) CPG).   Outcome: No Change  VSS. A&Ox4. Neuros intact. Pt requesting to leave hospital, no longer would like to continue with VEEG study, MD Cisneros paged and assessed pt at bedside, pt stating he would like to leave hospital tonight. Pt decided to discharged AMA. Dr Cisneros examined pt. Discharge orders reviewed with pt including discharge medications and follow up appointment, discharge paperwork given to pt with no further questions. Pt received Keppra IV then PIV removed. Pt left 6A with belonging at 2300, pts girlfriend picking pt up at lobby doors.

## 2018-01-18 ENCOUNTER — VIRTUAL VISIT (OUTPATIENT)
Dept: NEUROLOGY | Facility: CLINIC | Age: 28
End: 2018-01-18
Payer: COMMERCIAL

## 2018-01-18 DIAGNOSIS — R56.9 SEIZURE (H): Primary | ICD-10-CM

## 2018-01-18 NOTE — MR AVS SNAPSHOT
After Visit Summary   2018    Geovanni Mullen    MRN: [id number]           Patient Information     Date Of Birth          1990        Visit Information        Provider Department      2018 10:00 AM 1, Me [facility] Nurse [facility]        Today's Diagnoses     Seizure (H)    -  1       Follow-ups after your visit        Your next 10 appointments already scheduled     2018  2:00 PM CST   Return Visit with MD [facility] ([facility])    [address], [address]  [facility] [address] [phone]              Who to contact     Please call your clinic at [phone] to:    Ask questions about your health    Make or cancel appointments    Discuss your medicines    Learn about your test results    Speak to your doctor   If you have compliments or concerns about an experience at your clinic, or if you wish to file a complaint, please contact [facility] Patient Relations at [phone] or email us at [email or web address]         Additional Information About Your Visit        MyChart Information     [employer] is an electronic gateway that provides easy, online access to your medical records. With [employer], you can request a clinic appointment, read your test results, renew a prescription or communicate with your care team.     To sign up for [employer] visit the website at [email or web address]   You will be asked to enter the access code listed below, as well as some personal information. Please follow the directions to create your username and password.     Your access code is: [email or web address]  Expires: 2018 11:14 AM     Your access code will  in 90 days. If you need help or a new code, please contact your [facility] or call [phone] for assistance.        Care EveryWhere ID     This is your Care EveryWhere ID. This could be used by other organizations to  access your [facility] medical records  [id number]         Blood Pressure from Last 3 Encounters:   01/15/18 100/56   11/07/17 116/71    Weight from Last 3 Encounters:   01/15/18 126 lb 14.4 oz (57.6 kg)   11/07/17 131 lb 9.6 oz (59.7 kg)              Today, you had the following     No orders found for display       Primary Care Provider Fax #    Physician No Ref-Primary [phone]       No address on file        Equal Access to Services     ANIL CROOKS : Hadii aad ku hadasho Soomaali, waaxda luqadaha, qaybta kaalmada adeegyada, waxay idiin hayaan adeeg jajadorianbeatriz franklin . So [facility] [phone].    ATENCIÓN: Si habla español, tiene a cortez disposición servicios gratuitos de asistencia lingüística. [facility] [phone].    We comply with applicable federal civil rights laws and [place] laws. We do not discriminate on the basis of race, color, national origin, age, disability, sex, sexual orientation, or gender identity.            Thank you!     Thank you for choosing [facility]  for your care. Our goal is always to provide you with excellent care. Hearing back from our patients is one way we can continue to improve our services. Please take a few minutes to complete the written survey that you may receive in the mail after your visit with us. Thank you!             Your Updated Medication List - Protect others around you: Learn how to safely use, store and throw away your medicines at www.disposemymeds.org.          This list is accurate as of: 1/18/18  1:38 PM.  Always use your most recent med list.                   Brand Name Dispense Instructions for use Diagnosis    levETIRAcetam 750 MG tablet    KEPPRA    120 tablet    Titrate to 1500 mg twice a day    Spells of decreased attentiveness

## 2018-01-18 NOTE — PROCEDURES
"Discharge phone call placed:   Patient was admitted to [facility] from 1/15/18 to 1/15/18 for characterization of seizures. Patient left AMA the evening of admission.  Admission diagnosis:  Seizures  Discharge diagnosis:  seizures  Patient's understanding of the discharge diagnosis: Patient states that he learned during his hospital stay that he is unaware of his seizures when they occur. \"I can have seizures without knowing.\"  Hospital course: From discharge summary: He was placed on vEEG and had two events within the first few hours.  These events were consistent with complex partial seizures on EEG.  He was given a 1750 mg Keppra load and started on Keppra 500 mg BID(decreased from home dose) with plan to try to capture more seizures.  No further seizures were recorded but he had occasional left temporal epileptiform discharged and left temporal slowing. The evening of 1/15 he became restless and stated that he wanted to go home, citing that he did not have his maxwell console.  He was informed of the risks of discharging with lower anticonvulsant levels and agreed to another load of Keppra.  He was given 2000 mg Keppra IV and discharged on the previously planned dose of 1500 mg BID.  He was told to start taking this dose the morning after discharge.  He had not yet filled the 1500 BID prescription but stated he would do so the next morning.  He agreed to schedule next available follow up in epilepsy clinic.  He was discharged AMA the evening of 1/15.  Medications confirmed: [id number] [id number]  Questions regarding admission or discharge instructions:  Patient asks how he can stop the seizures from occurring. We discussed that a longer hospitalization would have given the providers an opportunity to fully evaluate and personalize his treatment plan. In the event of a shortened hospitalization we discussed lifestyle techniques to lessen seizure tendency such as maintaining a regular sleep schedule with adequate sleep, " eating well, getting regular exercise, and managing daily stresses. We discussed at length using a pill box to manage his medications so that he will notice if he has already taken his medication or if he has missed a dose. We discussed maintaining his confidence that his keppra doses are taken twice a day. I asked him to have his wife note how often he is having seizures. We discussed the importance of maintaining his safety as well as his children's as he is the primary care giver to his 5 young children. We discussed the possibility of medication changes at his next appointment if his seizures continue.  Confirmed follow up scheduled:  His appointment for Friday 2/16/18 was confirmed. Patient requests a reminder call for the appointment which I have placed on my schedule.     Patient states he has no further question. He thanks the staff for their support. He states he has the number to [facility] if he has questions or concerns before his next appointment.

## 2018-05-30 ENCOUNTER — TELEPHONE (OUTPATIENT)
Dept: NEUROLOGY | Facility: CLINIC | Age: 28
End: 2018-05-30

## 2018-05-30 NOTE — TELEPHONE ENCOUNTER
Shona Alvarenga LPN  P Me Shweta Alfaro [place]                   Caller: Suha   Relationship to Patient: girlfriend   Call Back Number: [phone]  Reason for Call: They need an updated letter in regards to the patient not being able to watch her children. She would like a call from Dr. Baxter. I told her she is out today      Suha calls requesting an updated letter stating Geovanni is unable to watch her children. She would like the letter FAXED to: [phone] attention: Scar  Case number: [id number]    Also, mail copy to home address:    [address] [place] [address]      A letter has been prepared and forwarded to MD for review/signature.

## 2018-05-30 NOTE — LETTER
2018       ATTENTION: CASE NUMBER [id number]    FAX# [phone]       RE: Geovanni Mullen  : 1990   MRN: [id number]        To Whom It May Concern,       Mr. Mullen has a medical condition which causes recurrent and paroxsymal loss of awareness. This raises concerns for child endangerment if he is the primary caregiver.     Effective 2017 (the date I first evaluated him) he is unable to be the primary caregiver for children.    I have encouraged him and family to seek alternative  arrangements.       Thank you.       Sirisha Baxter MD

## 2018-09-14 DIAGNOSIS — R68.89 SPELLS OF DECREASED ATTENTIVENESS: ICD-10-CM

## 2018-09-18 RX ORDER — LEVETIRACETAM 750 MG/1
TABLET ORAL
Qty: 120 TABLET | Refills: 0 | Status: SHIPPED | OUTPATIENT
Start: 2018-09-18 | End: 2018-10-16

## 2018-10-16 ENCOUNTER — OFFICE VISIT (OUTPATIENT)
Dept: NEUROLOGY | Facility: CLINIC | Age: 28
End: 2018-10-16
Payer: COMMERCIAL

## 2018-10-16 VITALS
HEIGHT: 70 IN | DIASTOLIC BLOOD PRESSURE: 79 MMHG | SYSTOLIC BLOOD PRESSURE: 135 MMHG | WEIGHT: 135 LBS | BODY MASS INDEX: 19.33 KG/M2 | HEART RATE: 82 BPM

## 2018-10-16 DIAGNOSIS — R68.89 SPELLS OF DECREASED ATTENTIVENESS: ICD-10-CM

## 2018-10-16 DIAGNOSIS — R56.9 SEIZURE (H): Primary | ICD-10-CM

## 2018-10-16 RX ORDER — OXCARBAZEPINE 150 MG/1
TABLET, FILM COATED ORAL
Qty: 120 TABLET | Refills: 3 | Status: SHIPPED | OUTPATIENT
Start: 2018-10-16 | End: 2018-12-18

## 2018-10-16 RX ORDER — DIAZEPAM 5 MG
TABLET ORAL
Qty: 2 TABLET | Refills: 0 | Status: SHIPPED | OUTPATIENT
Start: 2018-10-16

## 2018-10-16 RX ORDER — LEVETIRACETAM 750 MG/1
TABLET ORAL
Qty: 120 TABLET | Refills: 3 | Status: SHIPPED | OUTPATIENT
Start: 2018-10-16 | End: 2018-12-18

## 2018-10-16 NOTE — LETTER
10/16/2018       RE: Geovanni Mullen  : 1990   MRN: [id number]          To Whom It May Concern,     Mr. Mullen is compliant with medical care and recommendations. He may continue to work as a . He was advised not to drive, avoid any activities that might lead to self-injury or injury of others, avoid activities that include but are not limited to operating power tools, operating firearms, climbing ladders/trees/exposure to heights from which he might fall, and work on hot surfaces such stoves or ovens.    Thank you for your cooperation.    Sincerely,       Sirisha Baxter MD

## 2018-10-16 NOTE — LETTER
10/16/2018       RE: Geovanni Mullen  : 1990   MRN: [id number]      Dear Colleague,    Thank you for referring your patient, Geovanni Mullen, to the [facility] EPILEPSY CARE at [facility]. Please see a copy of my visit note below.    [facility] Epilepsy Care Progress Note    Patient:  Geovanni Mullen  :  1990   Age:  28 year old   Today's Office Visit:  10/16/2018    Epilepsy Data:  Seizure started 2016, he had aura of  lightheaded, sweaty, dizzy, heart palpitations, anxiety, radha vu sensation, and sometimes this would progress to unresponsiveness, lip smacking and repetitive hand movements.  Early on, he was treated as though these were anxiety attacks; he had one every month every other month, it seemed early on, and then this progressed to a generalized tonic-clonic convulsion in 2016.  No history of status epilepticus.   His grandmother had a stroke and had seizures.      Interval history:   Geovanni is accompanied with his girlfriend, Stephanie Torrez (Ja-lesa).He is taking levetiracetam 1500 mg twice a day. Patient did not fall , is compliant with anti seizure medications , did not have emergency room visit  or did not have any hospitalization . Currently, on antiepileptic drugs there is no fatigue, no dizziness, no double vision  or no cognitive dysfunction (decreased clarity in thinking). Some irritability. On this visit we spoke about patient's seizures, antiepileptic drug, and plan of epilepsy are. Patient/caregiver was agreeable with plan of care.     Seizure type 1 is lightheadedness, sweatiness, dizziness, heart palpitations, anxiety, radha vu  Feeling. He has one seizure every 3 days.   Seizure type 2 is he has an aura in which he feels sick.  He feels anxiety, becomes unresponsive, has chewing movements, repetitive picking movements of his right hand. 2-4 per month   Seizure type 3: generalized tonic-clonic convulsion. Last one was 10/16/2018. 3-4 generalized  "tonic-clonic convulsion per month.     Prior to Admission medications    Medication Sig Start Date End Date Taking? Authorizing Provider   levETIRAcetam (KEPPRA) 750 MG tablet TITRATE TO 2 TABLET BY MOUTH TWICE DAILY AS DIRECTED 9/18/18   Sirisha Baxter MD          MEDICATIONS:  Levetiracetam 1500 mg twice a day.      PAST ANTIEPILEPTIC DRUGS:    1. Depakote 500 mg twice a day was not effective 7/2016.     2. Levetiracetam started in fall 2016 and higher doses cause mood issues        SOCIAL HISTORY:  He was born in [place].  He lived there until 9 years old, and then he moved to [place], then [place].  He moved to [place] when he was 11 years old.  He has never had any death experiences, physical or sexual abuse.  He completed high school.  He works at [employer] as a . He has 5 kids, ages 1, 3, 5, 7 and 8.  He is their primary caretaker.  Alcohol:  He does not drink alcohol.  He smokes 5-6 cigarettes a day.  He has 6 cans of soda per day.  He smokes marijuana 3 times per week.  In the last month, he has not felt depressed, but he does feel helpless about the future because he is not able to work, and finances are a stress.  He does not have anhedonia.  He does have trouble sleeping at night.  He falls asleep at 3 a.m. in the morning.  He usually sleeps a couple hours and may take a nap in the morning.  He does not have regular sleep habits.        REVIEW OF SYSTEMS:  Notable for headache, right eye twitching, diffuse weakness, decreased appetite, constipation.  He sleeps 5-6 hours per night, usually at 3:00 a.m. in the morning.  He does have decreased memory.      /79  Pulse 82  Ht 5' 10\" (177.8 cm)  Wt 135 lb (61.2 kg)  BMI 19.37 kg/m2  Alert, orientated, speech is fluent, face is symmetric, extra-ocular movement in tact, no focal deficits noted.Gait is stable. He is able to walk tandem gait.     IMPRESSION:      1. Partial Epilepsy with loss of awareness, etiology " unknown, uncontrolled: He had Video EEG 1/15/2018 during which time he had two left temporal partial seizures. I ordered another MRI brain. He continues to have a high seizure burden. He has mood changes on levetiracetam, we will start oxcarbazepine. Other agents to consider are lamotrigine, phenytoin, vimpat,  gabapentin , lyrica, felbamate, zonisamide, topiramate.     2. Anxiety: untreated     3. Social: patient is working and concerned about losing his job. I asked him to call [facility] and learn about his patient rights, I also wrote a letter for work.        PLAN:     1.  Start oxcarbazepine and continue levetiracetam                  Medication Name   Tablet Size         AM  (morning)   PM (Night)   Notes    Week 1  oxcarbazepine 150 mg  1 tablet    1 tablet        levetiracetam 750 mg   2 tablet    2 tablet                   Medication Name   Tablet Size         AM  (morning)   PM (Night)   Notes    Week 2  oxcarbazepine 150 mg  2 tablet    2 tablet        levetiracetam 750 mg   2 tablet    2 tablet                   Medication Name   Tablet Size         AM  (morning)   PM (Night)   Notes    Week 3  oxcarbazepine 150 mg  3 tablet    3 tablet        levetiracetam 750 mg   2 tablet    2 tablet                     Medication Name   Tablet Size         AM  (morning)   PM (Night)   Notes    Week 4  oxcarbazepine 150 mg  4 tablet    4 tablet        levetiracetam 750 mg   2 tablet    2 tablet            2. MRI brain: Take Valium 5 mg ( 1 tablet) 45 minutes before your MRI brain. If this is not enough, may give another 2.5 (1/2 tablet again) 15 minutes prior to MRI brain if needed.     3. Talk to [facility] to understand  patient employment rights    4. The patient was advised to maintain proper seizure precautions. [place] regulations on driving were reviewed with the patient. The patient clearly understands that she/he is prohibited from operating a motor vehicle within 3  months following any seizure or other episode with sudden unconsciousness or inability to sit up, and that it is required to report most recent seizure to the DMV within 30 days after the event.    Patient was advised to avoid any activities that might lead to self-injury or injury of others, within 3 months following any seizure with impaired awareness or impaired motor control such activities include but are not limited to operating power tools, operating firearms, climbing ladders/trees/exposure to heights from which he might fall. Patient should not operate power tools or heavy machinery and equipment.  Patient was advised not to swim alone.  Patient should not bathe in any form of tub, such as bathtub, jacuzzi, or hot tub unless there is a responsible adult close by to provide assistance in case she has a seizure and drowns. Patient should not work on hot surfaces such stoves, ovens, or with scalding hot water.       5.   Nurse call in 3 weeks to check on antiepileptic drug     6. Follow up  8 weeks         I spent 40 minutes with the patient. During this time counseling and coordination of care exceeded 50% of the face to face visit time. I addressed all questions the patient raised in regards to their medical care.           SIRISHA RIBEIRO MD              Again, thank you for allowing me to participate in the care of your patient.      Sincerely,    Sirisha Ribeiro MD

## 2018-10-16 NOTE — Clinical Note
Please add patient data/information to flowsheet ([facility] EPILEPSY HISTORY). If you have any questions, please let me know.   Thank you for your help on this. Sirisha

## 2018-10-16 NOTE — LETTER
Date:October 17, 2018      Patient was self referred, no letter generated. Do not send.        [facility] Health Information

## 2018-10-16 NOTE — MR AVS SNAPSHOT
After Visit Summary   10/16/2018    Geovanni Mullen    MRN: [id number]           Patient Information     Date Of Birth          1990        Visit Information        Provider Department      10/16/2018 2:00 PM Sirisha Baxter MD [facility]        Today's Diagnoses     Seizure (H)    -  1    Spells of decreased attentiveness          Care Instructions                     Medication Name   Tablet Size         AM  (morning)   PM (Night)   Notes    Week 1  oxcarbazepine 150 mg  1 tablet    1 tablet        levetiracetam 750 mg   2 tablet    2 tablet                   Medication Name   Tablet Size         AM  (morning)   PM (Night)   Notes    Week 2  oxcarbazepine 150 mg  2 tablet    2 tablet        levetiracetam 750 mg   2 tablet    2 tablet                   Medication Name   Tablet Size         AM  (morning)   PM (Night)   Notes    Week 3  oxcarbazepine 150 mg  3 tablet    3 tablet        levetiracetam 750 mg   2 tablet    2 tablet                     Medication Name   Tablet Size         AM  (morning)   PM (Night)   Notes    Week 4  oxcarbazepine 150 mg  4 tablet    4 tablet        levetiracetam 750 mg   2 tablet    2 tablet        EAT FOOD AND THEN TAKE OXCARBAZEPINE.   Put an alarm on your phone     MRI brain: Take Valium 5 mg ( 1 tablet) 45 minutes before your MRI brain. If this is not enough, may give another 2.5 (1/2 tablet again) 15 minutes prior to MRI brain if needed.     Talk to [facility] [place] to understand your patient right    CONTINUE TAKING YOUR OTHER MEDICATIONS AS PREVIOUSLY DIRECTED.  IF YOU  HAVE ANY SIDE EFFECTS OR CONCERNS ABOUT YOUR ANTIEPILEPTIC DRUG CALL [facility] OFFICE [phone]. PLEASE FOLLOW MEDICATION CHANGES AS ADVISED.     This titration schedule was revive wed with patient or caregiver. They expressed understanding of these antiepileptic drug changes.     The patient was advised to maintain proper seizure precautions. [place] regulations on  driving were reviewed with the patient. The patient clearly understands that she/he is prohibited from operating a motor vehicle within 3 months following any seizure or other episode with sudden unconsciousness or inability to sit up, and that it is required to report most recent seizure to the DMV within 30 days after the event.    Patient was advised to avoid any activities that might lead to self-injury or injury of others, within 3 months following any seizure with impaired awareness or impaired motor control such activities include but are not limited to operating power tools, operating firearms, climbing ladders/trees/exposure to heights from which he might fall. Patient should not operate power tools or heavy machinery and equipment.  Patient was advised not to swim alone.  Patient should not bathe in any form of tub, such as bathtub, jacuzzi, or hot tub unless there is a responsible adult close by to provide assistance in case she has a seizure and drowns. Patient should not work on hot surfaces such stoves, ovens, or with scalding hot water.       SIRISHA RIBEIRO MD             Follow-ups after your visit        Follow-up notes from your care team     Return in about 8 weeks (around 12/11/2018) for Doctor Visit, Nurse Call in 3  weeks for AED changes.      Your next 10 appointments already scheduled     Nov 06, 2018  9:30 AM CST   Telephone Call with [facility] Nurse 2   [facility])    [address], [address]  [facility] [id number] [phone]           Note: this is not an onsite visit; there is no need to come to the facility.            Dec 18, 2018 11:30 AM CST   Return Visit with Sirisha Ribeiro MD   [facility])    [address], [address]  [facility] [id number] [phone]              Future tests that were ordered for you today     Open Future Orders        Priority Expected Expires Ordered    MR Brain w/o &  "w Contrast Routine  10/16/2019 10/16/2018            Who to contact     Please call your clinic at [phone] to:    Ask questions about your health    Make or cancel appointments    Discuss your medicines    Learn about your test results    Speak to your doctor            Additional Information About Your Visit        Care EveryWhere ID     This is your Care EveryWhere ID. This could be used by other organizations to access your [place] medical records  [id number]        Your Vitals Were     Pulse Height BMI (Body Mass Index)             82 5' 10\" (177.8 cm) 19.37 kg/m2          Blood Pressure from Last 3 Encounters:   10/16/18 135/79   01/15/18 100/56   11/07/17 116/71    Weight from Last 3 Encounters:   10/16/18 135 lb (61.2 kg)   01/15/18 126 lb 14.4 oz (57.6 kg)   11/07/17 131 lb 9.6 oz (59.7 kg)                 Today's Medication Changes          These changes are accurate as of 10/16/18  2:57 PM.  If you have any questions, ask your nurse or doctor.               Start taking these medicines.        Dose/Directions    diazepam 5 MG tablet   Commonly known as:  VALIUM   Used for:  Seizure (H), Spells of decreased attentiveness   Started by:  Sirisha Baxter MD        Take 45 minutes before your MRI brain. If this is not enough, may give another 2.5 (1/2 tablet again) 15 minutes prior to MRI brain if needed.   Quantity:  2 tablet   Refills:  0       OXcarbazepine 150 MG tablet   Commonly known as:  TRILEPTAL   Used for:  Seizure (H), Spells of decreased attentiveness   Started by:  Sirisha Baxter MD        Titrate to 600 mg twice a day   Quantity:  120 tablet   Refills:  3         These medicines have changed or have updated prescriptions.        Dose/Directions    levETIRAcetam 750 MG tablet   Commonly known as:  KEPPRA   This may have changed:  See the new instructions.   Used for:  Spells of decreased attentiveness, Seizure (H)   Changed by:  Sirisha Baxter MD        1500 mg twice a day   Quantity:  120 " tablet   Refills:  3            Where to get your medicines      These medications were sent to [id number] - [place], [place] - [address] AT [facility] [address]  [address] [facility] [address]     Phone:  [phone]     levETIRAcetam 750 MG tablet    OXcarbazepine 150 MG tablet         Some of these will need a paper prescription and others can be bought over the counter.  Ask your nurse if you have questions.     Bring a paper prescription for each of these medications     diazepam 5 MG tablet                Primary Care Provider Fax #    Physician No Ref-Primary [phone]       No address on file        Equal Access to Services     ANIL CROOKS : Мария Wolf, mi duval, jose alejandro reidalquirino loredo, zina vieyra. So [facility] [phone].    ATENCIÓN: Si habla español, tiene a cortez disposición servicios gratuitos de asistencia lingüística. Llame al [phone].    We comply with applicable federal civil rights laws and [place] laws. We do not discriminate on the basis of race, color, national origin, age, disability, sex, sexual orientation, or gender identity.            Thank you!     Thank you for choosing [facility]  for your care. Our goal is always to provide you with excellent care. Hearing back from our patients is one way we can continue to improve our services. Please take a few minutes to complete the written survey that you may receive in the mail after your visit with us. Thank you!             Your Updated Medication List - Protect others around you: Learn how to safely use, store and throw away your medicines at www.disposemymeds.org.          This list is accurate as of 10/16/18  2:57 PM.  Always use your most recent med list.                   Brand Name Dispense Instructions for use Diagnosis    diazepam 5 MG tablet    VALIUM    2 tablet    Take 45 minutes before your MRI brain. If  this is not enough, may give another 2.5 (1/2 tablet again) 15 minutes prior to MRI brain if needed.    Seizure (H), Spells of decreased attentiveness       levETIRAcetam 750 MG tablet    KEPPRA    120 tablet    1500 mg twice a day    Spells of decreased attentiveness, Seizure (H)       OXcarbazepine 150 MG tablet    TRILEPTAL    120 tablet    Titrate to 600 mg twice a day    Seizure (H), Spells of decreased attentiveness

## 2018-10-16 NOTE — PATIENT INSTRUCTIONS
Medication Name   Tablet Size         AM  (morning)   PM (Night)   Notes    Week 1  oxcarbazepine 150 mg  1 tablet    1 tablet        levetiracetam 750 mg   2 tablet    2 tablet                   Medication Name   Tablet Size         AM  (morning)   PM (Night)   Notes    Week 2  oxcarbazepine 150 mg  2 tablet    2 tablet        levetiracetam 750 mg   2 tablet    2 tablet                   Medication Name   Tablet Size         AM  (morning)   PM (Night)   Notes    Week 3  oxcarbazepine 150 mg  3 tablet    3 tablet        levetiracetam 750 mg   2 tablet    2 tablet                     Medication Name   Tablet Size         AM  (morning)   PM (Night)   Notes    Week 4  oxcarbazepine 150 mg  4 tablet    4 tablet        levetiracetam 750 mg   2 tablet    2 tablet        EAT FOOD AND THEN TAKE OXCARBAZEPINE.   Put an alarm on your phone     MRI brain: Take Valium 5 mg ( 1 tablet) 45 minutes before your MRI brain. If this is not enough, may give another 2.5 (1/2 tablet again) 15 minutes prior to MRI brain if needed.     Talk to [facility] [place] to understand your patient right    CONTINUE TAKING YOUR OTHER MEDICATIONS AS PREVIOUSLY DIRECTED.  IF YOU  HAVE ANY SIDE EFFECTS OR CONCERNS ABOUT YOUR ANTIEPILEPTIC DRUG CALL [facility] OFFICE -[phone]. PLEASE FOLLOW MEDICATION CHANGES AS ADVISED.     This titration schedule was revive wed with patient or caregiver. They expressed understanding of these antiepileptic drug changes.     The patient was advised to maintain proper seizure precautions. [place] regulations on driving were reviewed with the patient. The patient clearly understands that she/he is prohibited from operating a motor vehicle within 3 months following any seizure or other episode with sudden unconsciousness or inability to sit up, and that it is required to report most recent seizure to the DMV within 30 days after the event.    Patient was advised to avoid any activities  that might lead to self-injury or injury of others, within 3 months following any seizure with impaired awareness or impaired motor control such activities include but are not limited to operating power tools, operating firearms, climbing ladders/trees/exposure to heights from which he might fall. Patient should not operate power tools or heavy machinery and equipment.  Patient was advised not to swim alone.  Patient should not bathe in any form of tub, such as bathtub, jacuzzi, or hot tub unless there is a responsible adult close by to provide assistance in case she has a seizure and drowns. Patient should not work on hot surfaces such stoves, ovens, or with scalding hot water.       ELY RIBEIRO MD

## 2018-10-16 NOTE — PROGRESS NOTES
[facility]    Patient:  Geovanni Mullen  :  1990   Age:  28 year old   Today's Office Visit:  10/16/2018    Epilepsy Data:  Seizure started 2016, he had aura of  lightheaded, sweaty, dizzy, heart palpitations, anxiety, radha vu sensation, and sometimes this would progress to unresponsiveness, lip smacking and repetitive hand movements.  Early on, he was treated as though these were anxiety attacks; he had one every month every other month, it seemed early on, and then this progressed to a generalized tonic-clonic convulsion in 2016.  No history of status epilepticus.   His grandmother had a stroke and had seizures.      Interval history:   Geovanni is accompanied with his girlfriend, Stephanie Torrez (Ja-lesa).He is taking levetiracetam 1500 mg twice a day. Patient did not fall , is compliant with anti seizure medications , did not have emergency room visit  or did not have any hospitalization . Currently, on antiepileptic drugs there is no fatigue, no dizziness, no double vision  or no cognitive dysfunction (decreased clarity in thinking). Some irritability. On this visit we spoke about patient's seizures, antiepileptic drug, and plan of epilepsy are. Patient/caregiver was agreeable with plan of care.     Seizure type 1 is lightheadedness, sweatiness, dizziness, heart palpitations, anxiety, radha vu  Feeling. He has one seizure every 3 days.   Seizure type 2 is he has an aura in which he feels sick.  He feels anxiety, becomes unresponsive, has chewing movements, repetitive picking movements of his right hand. 2-4 per month   Seizure type 3: generalized tonic-clonic convulsion. Last one was 10/16/2018. 3-4 generalized tonic-clonic convulsion per month.     Prior to Admission medications    Medication Sig Start Date End Date Taking? Authorizing Provider   levETIRAcetam (KEPPRA) 750 MG tablet TITRATE TO 2 TABLET BY MOUTH TWICE DAILY AS DIRECTED 18   Sirisha Baxter MD         "  MEDICATIONS:  Levetiracetam 1500 mg twice a day.      PAST ANTIEPILEPTIC DRUGS:    1. Depakote 500 mg twice a day was not effective 7/2016.     2. Levetiracetam started in fall 2016 and higher doses cause mood issues        SOCIAL HISTORY:  He was born in [place].  He lived there until 9 years old, and then he moved to [place], then [place].  He moved to [place] when he was 11 years old.  He has never had any death experiences, physical or sexual abuse.  He completed high school.  He works at [employer] as a . He has 5 kids, ages 1, 3, 5, 7 and 8.  He is their primary caretaker.  Alcohol:  He does not drink alcohol.  He smokes 5-6 cigarettes a day.  He has 6 cans of soda per day.  He smokes marijuana 3 times per week.  In the last month, he has not felt depressed, but he does feel helpless about the future because he is not able to work, and finances are a stress.  He does not have anhedonia.  He does have trouble sleeping at night.  He falls asleep at 3 a.m. in the morning.  He usually sleeps a couple hours and may take a nap in the morning.  He does not have regular sleep habits.        REVIEW OF SYSTEMS:  Notable for headache, right eye twitching, diffuse weakness, decreased appetite, constipation.  He sleeps 5-6 hours per night, usually at 3:00 a.m. in the morning.  He does have decreased memory.      /79  Pulse 82  Ht 5' 10\" (177.8 cm)  Wt 135 lb (61.2 kg)  BMI 19.37 kg/m2  Alert, orientated, speech is fluent, face is symmetric, extra-ocular movement in tact, no focal deficits noted.Gait is stable. He is able to walk tandem gait.     IMPRESSION:      1. Partial Epilepsy with loss of awareness, etiology unknown, uncontrolled: He had Video EEG 1/15/2018 during which time he had two left temporal partial seizures. I ordered another MRI brain. He continues to have a high seizure burden. He has mood changes on levetiracetam, we will start oxcarbazepine. Other agents to " consider are lamotrigine, phenytoin, vimpat,  gabapentin , lyrica, felbamate, zonisamide, topiramate.     2. Anxiety: untreated     3. Social: patient is working and concerned about losing his job. I asked him to call [facility] and learn about his patient rights, I also wrote a letter for work.        PLAN:     1.  Start oxcarbazepine and continue levetiracetam                  Medication Name   Tablet Size         AM  (morning)   PM (Night)   Notes    Week 1  oxcarbazepine 150 mg  1 tablet    1 tablet        levetiracetam 750 mg   2 tablet    2 tablet                   Medication Name   Tablet Size         AM  (morning)   PM (Night)   Notes    Week 2  oxcarbazepine 150 mg  2 tablet    2 tablet        levetiracetam 750 mg   2 tablet    2 tablet                   Medication Name   Tablet Size         AM  (morning)   PM (Night)   Notes    Week 3  oxcarbazepine 150 mg  3 tablet    3 tablet        levetiracetam 750 mg   2 tablet    2 tablet                     Medication Name   Tablet Size         AM  (morning)   PM (Night)   Notes    Week 4  oxcarbazepine 150 mg  4 tablet    4 tablet        levetiracetam 750 mg   2 tablet    2 tablet            2. MRI brain: Take Valium 5 mg ( 1 tablet) 45 minutes before your MRI brain. If this is not enough, may give another 2.5 (1/2 tablet again) 15 minutes prior to MRI brain if needed.     3. Talk to [facility] to understand  patient employment rights    4. The patient was advised to maintain proper seizure precautions. [place] regulations on driving were reviewed with the patient. The patient clearly understands that she/he is prohibited from operating a motor vehicle within 3 months following any seizure or other episode with sudden unconsciousness or inability to sit up, and that it is required to report most recent seizure to the DMV within 30 days after the event.    Patient was advised to avoid any activities that might lead to  self-injury or injury of others, within 3 months following any seizure with impaired awareness or impaired motor control such activities include but are not limited to operating power tools, operating firearms, climbing ladders/trees/exposure to heights from which he might fall. Patient should not operate power tools or heavy machinery and equipment.  Patient was advised not to swim alone.  Patient should not bathe in any form of tub, such as bathtub, jacuzzi, or hot tub unless there is a responsible adult close by to provide assistance in case she has a seizure and drowns. Patient should not work on hot surfaces such stoves, ovens, or with scalding hot water.       5.   Nurse call in 3 weeks to check on antiepileptic drug     6. Follow up  8 weeks         I spent 40 minutes with the patient. During this time counseling and coordination of care exceeded 50% of the face to face visit time. I addressed all questions the patient raised in regards to their medical care.           ELY RIBEIRO MD

## 2018-10-16 NOTE — LETTER
10/16/2018       RE: Geovanni Mullen  : 1990   MRN: [id number]      To Whom It May Concern,     Mr. Mullen is compliant with medical care and recommendations. He may continue to work as a . He was advised not to drive, avoid any activities that might lead to self-injury or injury of others, avoid activities that include but are not limited to operating power tools, operating firearms, climbing ladders/trees/exposure to heights from which he might fall, and work on hot surfaces such stoves or ovens.  After seizures, he should rest until he is back to normal baseline function. In some instances, he may need 1-2 days off work after a large seizure.      Thank you for your cooperation.    Sincerely,       Sirisha Baxter MD

## 2018-10-23 ENCOUNTER — TRANSFERRED RECORDS (OUTPATIENT)
Dept: HEALTH INFORMATION MANAGEMENT | Facility: CLINIC | Age: 28
End: 2018-10-23

## 2018-10-29 ENCOUNTER — VIRTUAL VISIT (OUTPATIENT)
Dept: NEUROLOGY | Facility: CLINIC | Age: 28
End: 2018-10-29
Payer: COMMERCIAL

## 2018-10-29 DIAGNOSIS — R56.9 SEIZURE (H): Primary | ICD-10-CM

## 2018-10-29 NOTE — MR AVS SNAPSHOT
After Visit Summary   10/29/2018    Geovanni Mullen    MRN: [id number]           Patient Information     Date Of Birth          1990        Visit Information        Provider Department      10/29/2018 10:00 AM 1, Me [facility] Nurse [facility]        Today's Diagnoses     Seizure (H)    -  1       Follow-ups after your visit        Your next 10 appointments already scheduled     Nov 06, 2018  9:30 AM CST   Telephone Call with [facility] Nurse 2   [facility] ([facility])    [address], [address]  [facility] [address] [phone]           Note: this is not an onsite visit; there is no need to come to the facility.            Dec 18, 2018 11:30 AM CST   Return Visit with MD [facility] ([facility])    [address], [address]  [facility] [address] [phone]              Who to contact     Please call your clinic at [phone] to:    Ask questions about your health    Make or cancel appointments    Discuss your medicines    Learn about your test results    Speak to your doctor            Additional Information About Your Visit        Care EveryWhere ID     This is your Care EveryWhere ID. This could be used by other organizations to access your [place] medical records  [id number]         Blood Pressure from Last 3 Encounters:   10/16/18 135/79   01/15/18 100/56   11/07/17 116/71    Weight from Last 3 Encounters:   10/16/18 135 lb (61.2 kg)   01/15/18 126 lb 14.4 oz (57.6 kg)   11/07/17 131 lb 9.6 oz (59.7 kg)              Today, you had the following     No orders found for display       Primary Care Provider Fax #    Physician No Ref-Primary [phone]       No address on file        Equal Access to Services     ANIL CROOKS : Мария Wolf, mi duval, jose alejandro loredo, zina vieyra. So [facility] [phone].    ATENCIÓN: Si angle monk  cortez disposición servicios gratuitos de asistencia lingüística. Vin sage [phone].    We comply with applicable federal civil rights laws and [place] laws. We do not discriminate on the basis of race, color, national origin, age, disability, sex, sexual orientation, or gender identity.            Thank you!     Thank you for choosing [facility]  for your care. Our goal is always to provide you with excellent care. Hearing back from our patients is one way we can continue to improve our services. Please take a few minutes to complete the written survey that you may receive in the mail after your visit with us. Thank you!             Your Updated Medication List - Protect others around you: Learn how to safely use, store and throw away your medicines at www.disposemymeds.org.          This list is accurate as of 10/29/18 11:59 PM.  Always use your most recent med list.                   Brand Name Dispense Instructions for use Diagnosis    diazepam 5 MG tablet    VALIUM    2 tablet    Take 45 minutes before your MRI brain. If this is not enough, may give another 2.5 (1/2 tablet again) 15 minutes prior to MRI brain if needed.    Seizure (H), Spells of decreased attentiveness       levETIRAcetam 750 MG tablet    KEPPRA    120 tablet    1500 mg twice a day    Spells of decreased attentiveness, Seizure (H)       OXcarbazepine 150 MG tablet    TRILEPTAL    120 tablet    Titrate to 600 mg twice a day    Seizure (H), Spells of decreased attentiveness

## 2018-10-29 NOTE — PROGRESS NOTES
"Patient contacted to review MRI results.   I shared with him that the MRI showed no cause for seizures.     He shares with me that he is concerned about not having seizures controlled. He is taking OXC 2 tabs BID (300-300) he initially had nausea but that has subsided.     4 times this month he has \"passed out\". One minute he is working and then the next he is on the ground waking up with EMS next. His supervisor states he is on the ground shaking. Since October 1st he has had four of these events. 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 I advised that we need to make a plan to keep him safe. He is agreeable.     PLAN: review with house doctor.   "

## 2018-10-29 NOTE — LETTER
10/29/2018       RE: Geovanni Mullen  [address] [place] [id number]        Dear Geovanni Mullen,     You may share this with your employer.     During the next 2-4 weeks we advise office duty or light duty work while your medications are being optimized. Duties should not include: working with hot liquids, working with stoves or ovens, working with electric cutting devices, or other environments in which you could be injured or others around you could be injured if you were to lose consciousness.          Sincerely,       Jaja Gonzalez RN, for Dr. Sirisha Baxter

## 2018-10-30 NOTE — PROGRESS NOTES
Chart reviewed with Dr. Kim. Verbal orders for patient to perform office duty/light duty work for the next 2-4 weeks then re evaluate after trileptal is optimized. If work cannot accommodate, then the patient should be off work for that time.     This was discussed with the patient. He is agreeable. His mother was on the call as well, she asks if Geovanni qualifies as disabled. I advised that he is not disabled, but that we may need to find work that is more appropriate to maintain safety restrictions.     I provided them with the phone number to Jacoby Shane,  with [facility].    Letter provided to patient describing restrictions. He will pick it up today but also requests it to be mailed to his home address.

## 2018-12-18 ENCOUNTER — OFFICE VISIT (OUTPATIENT)
Dept: NEUROLOGY | Facility: CLINIC | Age: 28
End: 2018-12-18
Payer: COMMERCIAL

## 2018-12-18 VITALS
SYSTOLIC BLOOD PRESSURE: 108 MMHG | WEIGHT: 131.6 LBS | DIASTOLIC BLOOD PRESSURE: 63 MMHG | BODY MASS INDEX: 18.88 KG/M2 | TEMPERATURE: 98.3 F | HEART RATE: 96 BPM

## 2018-12-18 DIAGNOSIS — R56.9 SEIZURE (H): Primary | ICD-10-CM

## 2018-12-18 DIAGNOSIS — R68.89 SPELLS OF DECREASED ATTENTIVENESS: ICD-10-CM

## 2018-12-18 LAB
ALBUMIN SERPL-MCNC: 4.5 G/DL (ref 3.4–5)
ALP SERPL-CCNC: 77 U/L (ref 40–150)
ALT SERPL W P-5'-P-CCNC: 21 U/L (ref 0–70)
ANION GAP SERPL CALCULATED.3IONS-SCNC: 9 MMOL/L (ref 3–14)
AST SERPL W P-5'-P-CCNC: 17 U/L (ref 0–45)
BILIRUB SERPL-MCNC: 0.5 MG/DL (ref 0.2–1.3)
BUN SERPL-MCNC: 10 MG/DL (ref 7–30)
CALCIUM SERPL-MCNC: 9.1 MG/DL (ref 8.5–10.1)
CHLORIDE SERPL-SCNC: 106 MMOL/L (ref 94–109)
CO2 SERPL-SCNC: 27 MMOL/L (ref 20–32)
CREAT SERPL-MCNC: 0.87 MG/DL (ref 0.66–1.25)
GFR SERPL CREATININE-BSD FRML MDRD: >90 ML/MIN/1.7M2
GLUCOSE SERPL-MCNC: 105 MG/DL (ref 70–99)
POTASSIUM SERPL-SCNC: 4.2 MMOL/L (ref 3.4–5.3)
PROT SERPL-MCNC: 7.8 G/DL (ref 6.8–8.8)
SODIUM SERPL-SCNC: 142 MMOL/L (ref 133–144)

## 2018-12-18 RX ORDER — OXCARBAZEPINE 150 MG/1
TABLET, FILM COATED ORAL
Qty: 360 TABLET | Refills: 3 | Status: SHIPPED | OUTPATIENT
Start: 2018-12-18 | End: 2019-06-22

## 2018-12-18 RX ORDER — LEVETIRACETAM 750 MG/1
TABLET ORAL
Qty: 120 TABLET | Refills: 3 | Status: SHIPPED | OUTPATIENT
Start: 2018-12-18 | End: 2019-07-12

## 2018-12-18 ASSESSMENT — PAIN SCALES - GENERAL: PAINLEVEL: NO PAIN (0)

## 2018-12-18 NOTE — LETTER
Patient:  Geovanni Mullen  :   1990  MRN:     [id number]        Mr.Ceola Mullen  [address] [place] [id number]        2019    Dear ,    We are writing to inform you of your test results.    Your test results fall within the expected range(s) or remain unchanged from previous results.  Please continue with current treatment plan.    Resulted Orders   Oxcarbazepine level   Result Value Ref Range    10 Hydroxy Metabolite Level 13.5 10.0 - 35.0 ug/ml      Comment:      (Note)             Therapeutic efficacy has been demonstrated in             patients with trough 10-hydroxy metabolite             concentrations of 10.0 - 35.0 ug/ml.             Toxic concentrations have not been established.  Analysis performed by [facility] [place] [address]     Keppra (Levetiracetam) Level   Result Value Ref Range    Keppra (Levetiracetam) Level 38 12 - 46 ug/mL      Comment:      (Note)  INTERPRETIVE INFORMATION: Keppra (Levetiracetam)  Therapeutic Range:  12-46 ug/mL             Toxic:  Not well Established  Pharmacokinetics of levetiracetam are affected by renal   function. Adverse effects may include somnolence, weakness,   headache and vomiting.  This levetiracetam (Keppra) immunoassay uses the [employer] reagents, which has known cross-reactivity with   the drug brivaracetam (Briviact) and may report inaccurate   results. Patients transitioning from levetiracetam to   brivaracetam or those who are using both medications should   not monitor drug concentrations with the [employer]   assay. These patients should be monitored using a validated   chromatographic methodology that distinguishes between   drugs to determine drug concentrations.  Performed by [facility],  [address] [place] [id number] [phone]  [email or web address], Farzad Nielson MD, Lab. Director     Comprehensive metabolic panel   Result Value Ref Range    Sodium 142 133 - 144  mmol/L    Potassium 4.2 3.4 - 5.3 mmol/L    Chloride 106 94 - 109 mmol/L    Carbon Dioxide 27 20 - 32 mmol/L    Anion Gap 9 3 - 14 mmol/L    Glucose 105 (H) 70 - 99 mg/dL    Urea Nitrogen 10 7 - 30 mg/dL    Creatinine 0.87 0.66 - 1.25 mg/dL    GFR Estimate >90 >60 mL/min/1.7m2      Comment:      Starting 12/18/2018, serum creatinine based estimated GFR (eGFR) will be   calculated using the Chronic Kidney Disease Epidemiology Collaboration   (CKD-EPI) equation.      GFR Estimate If Black >90 >60 mL/min/1.7m2      Comment:      Starting 12/18/2018, serum creatinine based estimated GFR (eGFR) will be   calculated using the Chronic Kidney Disease Epidemiology Collaboration   (CKD-EPI) equation.      Calcium 9.1 8.5 - 10.1 mg/dL    Bilirubin Total 0.5 0.2 - 1.3 mg/dL    Albumin 4.5 3.4 - 5.0 g/dL    Protein Total 7.8 6.8 - 8.8 g/dL    Alkaline Phosphatase 77 40 - 150 U/L    ALT 21 0 - 70 U/L    AST 17 0 - 45 U/L       Sirisha Baxter MD               [phone]  1990

## 2018-12-18 NOTE — PATIENT INSTRUCTIONS
Medication Name   Tablet Size       week 1  AM  (morning)   PM (Night)   Notes     oxcarbazepine 150 mg  4 tablet    5 tablet        levetiracetam 750 mg   2 tablet    2 tablet                       Medication Name   Tablet Size        week 2  AM  (morning)   PM (Night)   Notes     oxcarbazepine 150 mg  5 tablet    5 tablet        levetiracetam 750 mg   2 tablet    2 tablet                     Medication Name   Tablet Size        week 3  AM  (morning)   PM (Night)   Notes     oxcarbazepine 150 mg  5 tablet    6 tablet        levetiracetam 750 mg   2 tablet    2 tablet                     Medication Name   Tablet Size        week 4  AM  (morning)   PM (Night)   Notes     oxcarbazepine 150 mg  6 tablet    6 tablet        levetiracetam 750 mg   2 tablet    2 tablet          Take medications after food.   Monitor for rash, dizziness, nausea, vomiting, mood changes  EEG 4 days     Sirisha Baxter MD

## 2018-12-18 NOTE — PROGRESS NOTES
[facility]    Patient:  Geovanni Mullen  :  1990   Age:  28 year old   Today's Office Visit:  2018    Epilepsy Data:  Seizure started 2016, he had aura of  lightheaded, sweaty, dizzy, heart palpitations, anxiety, radha vu sensation, and sometimes this would progress to unresponsiveness, lip smacking and repetitive hand movements.  Early on, he was treated as though these were anxiety attacks; he had one every month every other month, it seemed early on, and then this progressed to a generalized tonic-clonic convulsion in 2016.  No history of status epilepticus.   His grandmother had a stroke and had seizures.      Interval history:   Geovanni is accompanied with his girlfriend, Stephanie Torrez (Ja-lesa) and mom.He is taking levetiracetam 1500 mg twice a day.  Despite taking antiseizure medication on regular basis.  He continues to have high burden of what appeared to be complex partial seizures.  Overall they are happy with oxcarbazepine but it has not stopped his seizures.  I recommended that we optimize the dose further and then determine if a third agent has to be added on.  Patient did not fall , is compliant with anti seizure medications , did not have emergency room visit  or did not have any hospitalization . Currently, on antiepileptic drugs there is no fatigue, no dizziness, no double vision  or no cognitive dysfunction (decreased clarity in thinking). Some irritability. On this visit we spoke about patient's seizures, antiepileptic drug, and plan of epilepsy are. Patient/caregiver was agreeable with plan of care.  Patient and family are feeling frustrated and would like to know about future options.  I did talk to them about possible epilepsy surgery.  However it is premature at this time.  Additionally they inquired about the vagus nerve stimulator.    Before oxcarbazepine   Seizure type 1 is lightheadedness, sweatiness, dizziness, heart palpitations, anxiety, radha vu   Feeling. He has one seizure every 3 days. Mom says 3-4 per week   Seizure type 2 is he has an aura in which he feels sick.  He feels anxiety, becomes unresponsive, has chewing movements, repetitive picking movements of his right hand. 2-4 per month. Mom states it was daily.   Seizure type 3: generalized tonic-clonic convulsion. Last one was 10/16/2018. 3-4 generalized tonic-clonic convulsion per month.     After oxcarbazepine   Seizure type 1 is lightheadedness, sweatiness, dizziness, heart palpitations, anxiety, radha vu  Feeling. Once a week.    Seizure type 2 is he has an aura in which he feels sick.  He feels anxiety, becomes unresponsive, has chewing movements, repetitive picking movements of his right hand. 2-3 per week   Seizure type 3: generalized tonic-clonic convulsion. Last one was 10/16/2018.     Prior to Admission medications    Medication Sig Start Date End Date Taking? Authorizing Provider   diazepam (VALIUM) 5 MG tablet Take 45 minutes before your MRI brain. If this is not enough, may give another 2.5 (1/2 tablet again) 15 minutes prior to MRI brain if needed. 10/16/18  Yes Sirisha Baxter MD   levETIRAcetam (KEPPRA) 750 MG tablet 1500 mg twice a day 10/16/18  Yes Sirisha Baxter MD   OXcarbazepine (TRILEPTAL) 150 MG tablet Titrate to 600 mg twice a day 10/16/18  Yes Sirisha Baxter MD            MEDICATIONS:  L               Medication Name   Tablet Size         AM  (morning)   PM (Night)   Notes     oxcarbazepine 150 mg  4 tablet    4 tablet        levetiracetam 750 mg   2 tablet    2 tablet             PAST ANTIEPILEPTIC DRUGS:    1. Depakote 500 mg twice a day was not effective 7/2016.     2. Levetiracetam started in fall 2016 and higher doses cause mood issues        SOCIAL HISTORY:  He was born in [place].  He lived there until 9 years old, and then he moved to [place], then [place].  He moved to [place] when he was 11 years old.  He has never had any death experiences, physical or  sexual abuse.  He completed high school.  He worked at [employer] as a . He has 5 kids, ages 1, 3, 5, 7 and 8.  He is their primary caretaker.  Alcohol:  He does not drink alcohol.  He smokes 5-6 cigarettes a day.  He has 6 cans of soda per day.  He smokes marijuana 3 times per week.  In the last month, he has not felt depressed, but he does feel helpless about the future because he is not able to work, and finances are a stress.  He does not have anhedonia.  He does have trouble sleeping at night.  He falls asleep at 3 a.m. in the morning.  He usually sleeps a couple hours and may take a nap in the morning.  He does not have regular sleep habits.        REVIEW OF SYSTEMS:  Notable for headache, right eye twitching, diffuse weakness, decreased appetite, constipation.  He sleeps 5-6 hours per night, usually at 3:00 a.m. in the morning.  He does have decreased memory.      /63 (BP Location: Right arm, Patient Position: Chair, Cuff Size: Adult Regular)   Pulse 96   Temp 98.3  F (36.8  C)   Wt 131 lb 9.6 oz (59.7 kg)   BMI 18.88 kg/m    Alert, orientated, speech is fluent, face is symmetric, extra-ocular movement in tact, no focal deficits noted.Gait is stable. He is able to walk tandem gait.     IMPRESSION:      1. Partial Epilepsy with loss of awareness, etiology unknown, uncontrolled: He had Video EEG 1/15/2018 during which time he had two left temporal partial seizures. I ordered another MRI brain. He continues to have a high seizure burden, I suspect from left temporal region.  We will further optimize oxcarbazepine.  We will check levels today.  He has mood changes on levetiracetam, we will start oxcarbazepine. Other agents to consider are lamotrigine, phenytoin, vimpat,  gabapentin , lyrica, felbamate, zonisamide, topiramate.     2. Anxiety: untreated     3. Social: patient is working and concerned about losing his job. I asked him to call [facility] and learn about  his patient rights, I also wrote a letter for work.        PLAN:     1.  Start oxcarbazepine and continue levetiracetam                  Medication Name   Tablet Size       week 1  AM  (morning)   PM (Night)   Notes     oxcarbazepine 150 mg  4 tablet    5 tablet        levetiracetam 750 mg   2 tablet    2 tablet                       Medication Name   Tablet Size        week 2  AM  (morning)   PM (Night)   Notes     oxcarbazepine 150 mg  5 tablet    5 tablet        levetiracetam 750 mg   2 tablet    2 tablet                     Medication Name   Tablet Size        week 3  AM  (morning)   PM (Night)   Notes     oxcarbazepine 150 mg  5 tablet    6 tablet        levetiracetam 750 mg   2 tablet    2 tablet                     Medication Name   Tablet Size        week 4  AM  (morning)   PM (Night)   Notes     oxcarbazepine 150 mg  6 tablet    6 tablet        levetiracetam 750 mg   2 tablet    2 tablet            2. MRI brain: Take Valium 5 mg ( 1 tablet) 45 minutes before your MRI brain. If this is not enough, may give another 2.5 (1/2 tablet again) 15 minutes prior to MRI brain if needed.     3. Talk to [facility] [place] to understand  patient employment rights    4. The patient was advised to maintain proper seizure precautions. [place] regulations on driving were reviewed with the patient. The patient clearly understands that she/he is prohibited from operating a motor vehicle within 3 months following any seizure or other episode with sudden unconsciousness or inability to sit up, and that it is required to report most recent seizure to the DMV within 30 days after the event.    Patient was advised to avoid any activities that might lead to self-injury or injury of others, within 3 months following any seizure with impaired awareness or impaired motor control such activities include but are not limited to operating power tools, operating firearms, climbing ladders/trees/exposure to heights from which  he might fall. Patient should not operate power tools or heavy machinery and equipment.  Patient was advised not to swim alone.  Patient should not bathe in any form of tub, such as bathtub, jacuzzi, or hot tub unless there is a responsible adult close by to provide assistance in case she has a seizure and drowns. Patient should not work on hot surfaces such stoves, ovens, or with scalding hot water.       5.   Nurse call in 3 weeks to check on antiepileptic drug     6. Follow up  8 weeks         I spent 40 minutes with the patient. During this time counseling and coordination of care exceeded 50% of the face to face visit time. I addressed all questions the patient raised in regards to their medical care.           ELY RIBEIRO MD

## 2018-12-18 NOTE — LETTER
2018       RE: Geovanni Mullen  : 1990   MRN: [id number]      Dear Colleague,    Thank you for referring your patient, Geovanni Mullen, to the [facility] EPILEPSY CARE at [facility]. Please see a copy of my visit note below.    [facility] Epilepsy Care Progress Note    Patient:  Geovanni Mullen  :  1990   Age:  28 year old   Today's Office Visit:  2018    Epilepsy Data:  Seizure started 2016, he had aura of  lightheaded, sweaty, dizzy, heart palpitations, anxiety, radha vu sensation, and sometimes this would progress to unresponsiveness, lip smacking and repetitive hand movements.  Early on, he was treated as though these were anxiety attacks; he had one every month every other month, it seemed early on, and then this progressed to a generalized tonic-clonic convulsion in 2016.  No history of status epilepticus.   His grandmother had a stroke and had seizures.      Interval history:   Geovanni is accompanied with his girlfriend, Stephanie Torrez (Ja-lesa) and mom.He is taking levetiracetam 1500 mg twice a day.  Despite taking antiseizure medication on regular basis.  He continues to have high burden of what appeared to be complex partial seizures.  Overall they are happy with oxcarbazepine but it has not stopped his seizures.  I recommended that we optimize the dose further and then determine if a third agent has to be added on.  Patient did not fall , is compliant with anti seizure medications , did not have emergency room visit  or did not have any hospitalization . Currently, on antiepileptic drugs there is no fatigue, no dizziness, no double vision  or no cognitive dysfunction (decreased clarity in thinking). Some irritability. On this visit we spoke about patient's seizures, antiepileptic drug, and plan of epilepsy are. Patient/caregiver was agreeable with plan of care.  Patient and family are feeling frustrated and would like to know about future options.  I  did talk to them about possible epilepsy surgery.  However it is premature at this time.  Additionally they inquired about the vagus nerve stimulator.    Before oxcarbazepine   Seizure type 1 is lightheadedness, sweatiness, dizziness, heart palpitations, anxiety, radha vu  Feeling. He has one seizure every 3 days. Mom says 3-4 per week   Seizure type 2 is he has an aura in which he feels sick.  He feels anxiety, becomes unresponsive, has chewing movements, repetitive picking movements of his right hand. 2-4 per month. Mom states it was daily.   Seizure type 3: generalized tonic-clonic convulsion. Last one was 10/16/2018. 3-4 generalized tonic-clonic convulsion per month.     After oxcarbazepine   Seizure type 1 is lightheadedness, sweatiness, dizziness, heart palpitations, anxiety, radha vu  Feeling. Once a week.    Seizure type 2 is he has an aura in which he feels sick.  He feels anxiety, becomes unresponsive, has chewing movements, repetitive picking movements of his right hand. 2-3 per week   Seizure type 3: generalized tonic-clonic convulsion. Last one was 10/16/2018.     Prior to Admission medications    Medication Sig Start Date End Date Taking? Authorizing Provider   diazepam (VALIUM) 5 MG tablet Take 45 minutes before your MRI brain. If this is not enough, may give another 2.5 (1/2 tablet again) 15 minutes prior to MRI brain if needed. 10/16/18  Yes Sirisha Baxter MD   levETIRAcetam (KEPPRA) 750 MG tablet 1500 mg twice a day 10/16/18  Yes Sirisha Baxter MD   OXcarbazepine (TRILEPTAL) 150 MG tablet Titrate to 600 mg twice a day 10/16/18  Yes Sirisha Baxter MD            MEDICATIONS:  L               Medication Name   Tablet Size         AM  (morning)   PM (Night)   Notes     oxcarbazepine 150 mg  4 tablet    4 tablet        levetiracetam 750 mg   2 tablet    2 tablet             PAST ANTIEPILEPTIC DRUGS:    1. Depakote 500 mg twice a day was not effective 7/2016.     2. Levetiracetam started in fall 2016 and  higher doses cause mood issues        SOCIAL HISTORY:  He was born in [place].  He lived there until 9 years old, and then he moved to [place], then [place].  He moved to [place] when he was 11 years old.  He has never had any death experiences, physical or sexual abuse.  He completed high school.  He worked at [employer] as a . He has 5 kids, ages 1, 3, 5, 7 and 8.  He is their primary caretaker.  Alcohol:  He does not drink alcohol.  He smokes 5-6 cigarettes a day.  He has 6 cans of soda per day.  He smokes marijuana 3 times per week.  In the last month, he has not felt depressed, but he does feel helpless about the future because he is not able to work, and finances are a stress.  He does not have anhedonia.  He does have trouble sleeping at night.  He falls asleep at 3 a.m. in the morning.  He usually sleeps a couple hours and may take a nap in the morning.  He does not have regular sleep habits.        REVIEW OF SYSTEMS:  Notable for headache, right eye twitching, diffuse weakness, decreased appetite, constipation.  He sleeps 5-6 hours per night, usually at 3:00 a.m. in the morning.  He does have decreased memory.      /63 (BP Location: Right arm, Patient Position: Chair, Cuff Size: Adult Regular)   Pulse 96   Temp 98.3  F (36.8  C)   Wt 131 lb 9.6 oz (59.7 kg)   BMI 18.88 kg/m     Alert, orientated, speech is fluent, face is symmetric, extra-ocular movement in tact, no focal deficits noted.Gait is stable. He is able to walk tandem gait.     IMPRESSION:      1. Partial Epilepsy with loss of awareness, etiology unknown, uncontrolled: He had Video EEG 1/15/2018 during which time he had two left temporal partial seizures. I ordered another MRI brain. He continues to have a high seizure burden, I suspect from left temporal region.  We will further optimize oxcarbazepine.  We will check levels today.  He has mood changes on levetiracetam, we will start oxcarbazepine.  Other agents to consider are lamotrigine, phenytoin, vimpat,  gabapentin , lyrica, felbamate, zonisamide, topiramate.     2. Anxiety: untreated     3. Social: patient is working and concerned about losing his job. I asked him to call [facility] and learn about his patient rights, I also wrote a letter for work.        PLAN:     1.  Start oxcarbazepine and continue levetiracetam                  Medication Name   Tablet Size       week 1  AM  (morning)   PM (Night)   Notes     oxcarbazepine 150 mg  4 tablet    5 tablet        levetiracetam 750 mg   2 tablet    2 tablet                       Medication Name   Tablet Size        week 2  AM  (morning)   PM (Night)   Notes     oxcarbazepine 150 mg  5 tablet    5 tablet        levetiracetam 750 mg   2 tablet    2 tablet                     Medication Name   Tablet Size        week 3  AM  (morning)   PM (Night)   Notes     oxcarbazepine 150 mg  5 tablet    6 tablet        levetiracetam 750 mg   2 tablet    2 tablet                     Medication Name   Tablet Size        week 4  AM  (morning)   PM (Night)   Notes     oxcarbazepine 150 mg  6 tablet    6 tablet        levetiracetam 750 mg   2 tablet    2 tablet            2. MRI brain: Take Valium 5 mg ( 1 tablet) 45 minutes before your MRI brain. If this is not enough, may give another 2.5 (1/2 tablet again) 15 minutes prior to MRI brain if needed.     3. Talk to [facility] to understand  patient employment rights    4. The patient was advised to maintain proper seizure precautions. [place] regulations on driving were reviewed with the patient. The patient clearly understands that she/he is prohibited from operating a motor vehicle within 3 months following any seizure or other episode with sudden unconsciousness or inability to sit up, and that it is required to report most recent seizure to the DMV within 30 days after the event.    Patient was advised to avoid any activities that  might lead to self-injury or injury of others, within 3 months following any seizure with impaired awareness or impaired motor control such activities include but are not limited to operating power tools, operating firearms, climbing ladders/trees/exposure to heights from which he might fall. Patient should not operate power tools or heavy machinery and equipment.  Patient was advised not to swim alone.  Patient should not bathe in any form of tub, such as bathtub, jacuzzi, or hot tub unless there is a responsible adult close by to provide assistance in case she has a seizure and drowns. Patient should not work on hot surfaces such stoves, ovens, or with scalding hot water.       5.   Nurse call in 3 weeks to check on antiepileptic drug     6. Follow up  8 weeks         I spent 40 minutes with the patient. During this time counseling and coordination of care exceeded 50% of the face to face visit time. I addressed all questions the patient raised in regards to their medical care.           SIRISHA RIBEIRO MD              Again, thank you for allowing me to participate in the care of your patient.      Sincerely,    Sirisha Ribeiro MD

## 2018-12-18 NOTE — LETTER
Date:December 19, 2018      Patient was self referred, no letter generated. Do not send.        [facility]

## 2018-12-18 NOTE — LETTER
2018       RE: Geovanni Mullen  : 1990   MRN: [id number]         To Whom It May Concern,      Mr. Mullen is compliant with medical care and recommendations. He continues to have seizures, despite treatment. He was advised not to drive, avoid any activities that might lead to self-injury or injury of others, avoid activities that include but are not limited to operating power tools, operating firearms, climbing ladders/trees/exposure to heights from which he might fall, use of sharp objects, and work on hot surfaces such stoves or ovens.    After seizures, he should rest until he is back to normal baseline function. In some instances, he may need1-2 days off work after a large seizure. We request that you accommodate his medical needs.      Thank you for your cooperation.     Sincerely,         Sirisha Baxter MD

## 2018-12-20 LAB
10OH-CARBAZEPINE SERPL-MCNC: 13.5 UG/ML
LEVETIRACETAM SERPL-MCNC: 38 UG/ML (ref 12–46)

## 2018-12-27 ENCOUNTER — TELEPHONE (OUTPATIENT)
Dept: NEUROLOGY | Facility: CLINIC | Age: 28
End: 2018-12-27

## 2018-12-27 NOTE — TELEPHONE ENCOUNTER
Patient missed his appointment today with nursing to fill out disability application paperwork together. He was contacted by telephone and requested to reschedule. He is already scheduled with a nurse for a telephone visit 1/2/19 to review med changes, so we will update this to be an in clinic visit with the nurse to review both med changes and the paper work. Geovanni states he has the paper work ready for the visit and will bring them in. Per Dr. Baxter paperwork is needed to be prepared with the patient to get all the information needed for the forms.

## 2019-02-20 DIAGNOSIS — R56.9 SEIZURE (H): ICD-10-CM

## 2019-02-20 DIAGNOSIS — R68.89 SPELLS OF DECREASED ATTENTIVENESS: ICD-10-CM

## 2019-02-20 RX ORDER — LEVETIRACETAM 750 MG/1
TABLET ORAL
Qty: 60 TABLET | Refills: 0 | Status: SHIPPED | OUTPATIENT
Start: 2019-02-20 | End: 2019-09-19

## 2019-05-20 ENCOUNTER — TELEPHONE (OUTPATIENT)
Dept: NEUROLOGY | Facility: CLINIC | Age: 29
End: 2019-05-20

## 2019-05-20 NOTE — TELEPHONE ENCOUNTER
What is the concern that needs to be addressed by a nurse? Sergo is requesting the May 30, 2018 updated for the new year and be sent to her, via mail.  Requesting letter be mailed to her this week.    May a detailed message be left on voicemail? Yes    Date of last office visit: 12/18/18    Message routed to: [facility] RN Pool

## 2019-05-21 NOTE — TELEPHONE ENCOUNTER
Received message below, letter located, updated and printed.  Faxed to [facility] to Dr. Baxter's attention.

## 2019-06-22 DIAGNOSIS — R56.9 SEIZURE (H): ICD-10-CM

## 2019-06-22 DIAGNOSIS — R68.89 SPELLS OF DECREASED ATTENTIVENESS: ICD-10-CM

## 2019-06-22 NOTE — LETTER
6/22/2019       RE: Geovanni Mullen  [address] [place] [id number]      Dear Geovanni Mullen       Our office received a request for a medication refill authorized by Sirisha Baxter M.D.. A refill has been provided today, however to receive further refills you will need to be re-evaluated in clinic. Please call our office at [phone] to schedule at your earliest convenience.      Sincerely,     [facility]  [facility] [place]  [phone]

## 2019-06-24 RX ORDER — OXCARBAZEPINE 150 MG/1
TABLET, FILM COATED ORAL
Qty: 360 TABLET | Refills: 0 | Status: SHIPPED | OUTPATIENT
Start: 2019-06-24 | End: 2019-07-23

## 2019-06-24 NOTE — TELEPHONE ENCOUNTER
Medication request meets [place] Medication Refill Protocol - Seizure Medications (non-controlled) requirements for a one time refill. Patient is overdue for an appointment. A letter has been sent to the patient's address on file requesting a return visit.

## 2019-07-12 DIAGNOSIS — R56.9 SEIZURE (H): ICD-10-CM

## 2019-07-12 DIAGNOSIS — R68.89 SPELLS OF DECREASED ATTENTIVENESS: ICD-10-CM

## 2019-07-12 RX ORDER — LEVETIRACETAM 750 MG/1
TABLET ORAL
Qty: 56 TABLET | Refills: 0 | Status: SHIPPED | OUTPATIENT
Start: 2019-07-12 | End: 2019-08-09

## 2019-07-23 DIAGNOSIS — R68.89 SPELLS OF DECREASED ATTENTIVENESS: ICD-10-CM

## 2019-07-23 DIAGNOSIS — R56.9 SEIZURE (H): Primary | ICD-10-CM

## 2019-07-23 RX ORDER — OXCARBAZEPINE 150 MG/1
TABLET, FILM COATED ORAL
Qty: 360 TABLET | Refills: 0 | Status: SHIPPED | OUTPATIENT
Start: 2019-07-23 | End: 2019-09-19

## 2019-08-09 DIAGNOSIS — R56.9 SEIZURE (H): ICD-10-CM

## 2019-08-09 DIAGNOSIS — R68.89 SPELLS OF DECREASED ATTENTIVENESS: ICD-10-CM

## 2019-08-12 RX ORDER — LEVETIRACETAM 750 MG/1
TABLET ORAL
Qty: 56 TABLET | Refills: 0 | Status: SHIPPED | OUTPATIENT
Start: 2019-08-12 | End: 2019-08-22

## 2019-08-22 DIAGNOSIS — R56.9 SEIZURE (H): ICD-10-CM

## 2019-08-22 DIAGNOSIS — R68.89 SPELLS OF DECREASED ATTENTIVENESS: ICD-10-CM

## 2019-08-23 RX ORDER — LEVETIRACETAM 750 MG/1
TABLET ORAL
Qty: 120 TABLET | Refills: 0 | Status: SHIPPED | OUTPATIENT
Start: 2019-08-23 | End: 2019-09-19

## 2019-08-23 NOTE — TELEPHONE ENCOUNTER
Medication request meets [place] Medication Refill Protocol - Seizure Medications (non-controlled) requirements for a one time refill. Patient is overdue for an appointment. He returned our call and scheduled a return visit for 9/11/19.

## 2019-08-23 NOTE — TELEPHONE ENCOUNTER
Pt scheduled apt for 9/11/19. Girlfriend states he is out and needs a refill until this appointment. She would like this done ASAP before the weekend

## 2019-08-23 NOTE — TELEPHONE ENCOUNTER
Patient is overdue for a return visit. He has previously been sent a letter requesting that he schedule his return visit for further refills. He continues to not meet parameters for a refill under our protocol. Voicemail left for patient requesting a return call to the clinic.

## 2019-09-19 DIAGNOSIS — R68.89 SPELLS OF DECREASED ATTENTIVENESS: ICD-10-CM

## 2019-09-19 DIAGNOSIS — R56.9 SEIZURE (H): ICD-10-CM

## 2019-09-19 RX ORDER — LEVETIRACETAM 750 MG/1
1500 TABLET ORAL 2 TIMES DAILY
Qty: 120 TABLET | Refills: 1 | Status: SHIPPED | OUTPATIENT
Start: 2019-09-19 | End: 2019-11-27

## 2019-09-19 RX ORDER — LEVETIRACETAM 750 MG/1
TABLET ORAL
Qty: 120 TABLET | Refills: 0 | OUTPATIENT
Start: 2019-09-19

## 2019-09-19 NOTE — TELEPHONE ENCOUNTER
Refill request for levetiracetam  Needs to be seen in clinic, last seen in December 2018, with 8 week MD follow up ordered. Patient has also missed 2 nurse appointments in the intervening time    Message sent to scheduling staff to contact patient to make an appointment for follow up / refills

## 2019-09-23 RX ORDER — OXCARBAZEPINE 150 MG/1
TABLET, FILM COATED ORAL
Qty: 360 TABLET | Refills: 0 | Status: SHIPPED | OUTPATIENT
Start: 2019-09-23 | End: 2019-10-20

## 2019-10-20 DIAGNOSIS — R56.9 SEIZURE (H): Primary | ICD-10-CM

## 2019-10-20 DIAGNOSIS — R68.89 SPELLS OF DECREASED ATTENTIVENESS: ICD-10-CM

## 2019-10-21 RX ORDER — OXCARBAZEPINE 150 MG/1
TABLET, FILM COATED ORAL
Qty: 360 TABLET | Refills: 0 | Status: SHIPPED | OUTPATIENT
Start: 2019-10-21 | End: 2019-11-27

## 2019-11-23 DIAGNOSIS — R56.9 SEIZURE (H): ICD-10-CM

## 2019-11-23 DIAGNOSIS — R68.89 SPELLS OF DECREASED ATTENTIVENESS: ICD-10-CM

## 2019-11-25 RX ORDER — LEVETIRACETAM 750 MG/1
TABLET ORAL
Qty: 120 TABLET | Refills: 0 | OUTPATIENT
Start: 2019-11-25

## 2019-11-25 NOTE — TELEPHONE ENCOUNTER
Last seen:12/18/19  RTC: 8 weeks  Cancel: two  No-show: four  Next appt: none scheduled    [facility]    Medication requested: levetiracetam 750mg tablet  Directions: take 2 tablets by mouth twice a day  Qty: 120  Last refilled: 9/19/19.    A letter was mailed to the patient on 6/24/19 notifying him that he would need to return to clinic to receive refills.     Medication fails nursing refill protocol.

## 2019-11-27 ENCOUNTER — TELEPHONE (OUTPATIENT)
Dept: NEUROLOGY | Facility: CLINIC | Age: 29
End: 2019-11-27

## 2019-11-27 DIAGNOSIS — R68.89 SPELLS OF DECREASED ATTENTIVENESS: ICD-10-CM

## 2019-11-27 DIAGNOSIS — R56.9 SEIZURE (H): Primary | ICD-10-CM

## 2019-11-27 DIAGNOSIS — R56.9 SEIZURE (H): ICD-10-CM

## 2019-11-27 RX ORDER — LEVETIRACETAM 750 MG/1
1500 TABLET ORAL 2 TIMES DAILY
Qty: 60 TABLET | Refills: 0 | Status: SHIPPED | OUTPATIENT
Start: 2019-11-27 | End: 2019-12-12

## 2019-11-27 RX ORDER — LEVETIRACETAM 750 MG/1
TABLET ORAL
Qty: 120 TABLET | Refills: 0 | Status: SHIPPED | OUTPATIENT
Start: 2019-11-27 | End: 2019-11-27

## 2019-11-27 RX ORDER — OXCARBAZEPINE 150 MG/1
600 TABLET, FILM COATED ORAL 2 TIMES DAILY
Qty: 120 TABLET | Refills: 0 | Status: SHIPPED | OUTPATIENT
Start: 2019-11-27 | End: 2020-03-18

## 2019-11-27 NOTE — TELEPHONE ENCOUNTER
Mother called in because prescription refills have been denied. See previous notes for details.  Per the , Geovanni's mother was very upset and angry. She was demanding a call back.    Discussed with .  OK to provide two week refill so that patient can be seen in [facility] or can arrange to follow with a different provider.    Patient was last seen December 2018, and an 8 week follow up recommended.  Patient has missed or cancelled numerous appointments since then, and has no appointment scheduled  See screen shot for details.        Call placed to mother, and 's instructions discussed.  I reviewed the multiple missed or cancelled appointments, and reminded mother that the patient needs to be seen and needs to follow recommendations to continue seeing .  Mother was shouting about various perceived issues, including that they did show up to be seen, but that  was not in clinic. I reminded mother that appointments need to be made as the provider is not always in clinic, and is not available to see patients on a drop in basis.    I reviewed  instructions again, and mother agreed to make an appointment for Geovanni to be seen.    I confirmed the preferred pharmacy as [facility] on [address].  I reviewed the medications with mother.  She confirmed levetiracetam as [id number] tablets twice daily, but reported that the oxcarbazepine is [id number] tablets twice daily (not 6 as the previous prescription was written for)    No other questions at this time.    Transferred to scheduling.    Refills provided as per MD verbal order

## 2019-12-11 ENCOUNTER — OFFICE VISIT (OUTPATIENT)
Dept: NEUROLOGY | Facility: CLINIC | Age: 29
End: 2019-12-11
Payer: COMMERCIAL

## 2019-12-11 VITALS
HEART RATE: 79 BPM | WEIGHT: 132.6 LBS | SYSTOLIC BLOOD PRESSURE: 112 MMHG | DIASTOLIC BLOOD PRESSURE: 68 MMHG | BODY MASS INDEX: 19.03 KG/M2

## 2019-12-11 DIAGNOSIS — R56.9 SEIZURE (H): ICD-10-CM

## 2019-12-11 DIAGNOSIS — R56.9 SEIZURE (H): Primary | ICD-10-CM

## 2019-12-11 DIAGNOSIS — R68.89 SPELLS OF DECREASED ATTENTIVENESS: ICD-10-CM

## 2019-12-11 RX ORDER — OXCARBAZEPINE 600 MG/1
TABLET, FILM COATED ORAL
Qty: 270 TABLET | Refills: 1 | Status: SHIPPED | OUTPATIENT
Start: 2019-12-11 | End: 2020-03-18

## 2019-12-11 ASSESSMENT — PAIN SCALES - GENERAL: PAINLEVEL: SEVERE PAIN (6)

## 2019-12-11 NOTE — PATIENT INSTRUCTIONS
"               Medication Name   Tablet Size        week 1-2  AM  (morning)   PM (Night)   Notes     oxcarbazepine 600 mg  1 tablet    1.5 tablet   your tablet size was changed to 600 mg tablet. Increase dose.      levetiracetam 750 mg   2 tablet    2 tablet                     Medication Name   Tablet Size       week 3 - onward  AM  (morning)   PM (Night)   Notes     oxcarbazepine 600 mg  1 tablet    2 tablet        levetiracetam 750 mg   2 tablet    2 tablet          Check labs in 4 weeks, go to [facility]  If you continue to have seizures we will have to consider another seizure medication such as lamotrigine   You can play video games   Talk to therapist about \"new normal\" and think about \"opportunities instead of limitations\"   Follow up  3-4 months      What is SUDEP? SUDEP is the sudden, unexpected, non-traumatic, non-accidental death of a patient with epilepsy that cannot be explained by another cause of death. SUDEP most often occurs at night and may or may not be in the context of a known seizure. The mechanisms of SUDEP are not fully understood but are considered to be multifactorial.     Who is at highest risk? SUDEP is uncommon. In adults, 1 in 1000 adults will be affected per year; in other words, 99.9% of adults will not be affected annually. In children, 1 in 4,500 children will be affected per year; 99.99% of children will not be affected annually. However, some recent studies suggest that the incidence in children may be higher and similar to that in adults. The greatest overall risk factor for SUDEP among people with epilepsy is frequent (3 or more per year) generalized tonic-clonic seizures. Seizures during sleep may also increase risk.  In certain syndromes, such as Dravet syndrome, SUDEP risk rises dramatically.    What to do? Focus on taking seizure medications. Despite limited evidence, patients with epilepsy and their families may wish to consider nighttime supervision or use of " seizure detection devices, if appropriate and feasible. The value of such devices (e.g. seizure detection wearables) and anti-suffocation pillows remains unproven. Even emergent medical intervention does not always preclude the occurrence of SUDEP. It is best to be educated about SUDEP, take seizure medications as recommended by your doctor, and maintain healthy life style habits.     Written materials:  AAN/AES Summary of Practice Guideline for Patients and their Families. https://www.aan.com/Guidelines/home/GetGuidelineContent/851).

## 2019-12-11 NOTE — LETTER
Date:December 12, 2019      Patient was self referred, no letter generated. Do not send.        [facility] Health Information

## 2019-12-11 NOTE — PROGRESS NOTES
[facility]    Patient:  Geovanni Mullen  :  1990   Age:  28 year old   Today's Office Visit:  2018    Epilepsy Data:  Seizure started 2016, he had aura of  lightheaded, sweaty, dizzy, heart palpitations, anxiety, radha vu sensation, and sometimes this would progress to unresponsiveness, lip smacking and repetitive hand movements.  Early on, he was treated as though these were anxiety attacks; he had one every month every other month, it seemed early on, and then this progressed to a generalized tonic-clonic convulsion in 2016.  No history of status epilepticus.   His grandmother had a stroke and had seizures.      Interval history:   Geovanni is accompanied with his girlfriend, Stephanie Torrez (Ja-lesa) and mom.He is taking levetiracetam 1500 mg twice a day.  He had two generalized tonic-clonic convulsion 2019 these were witnessed by Stephanie. He states his memory is worsening. Patient states antiepileptic drug were not missed, no active infection, no sleep deprivation, no excessive alcohol use, no obvious trigger for seizure. Despite taking antiseizure medication on regular basis, I suspect, he continues to have complex partial seizures.  He is willing to increase oxcarbazepine. He feels depressed and would like to work.     Currently, on antiepileptic drugs there is fatigue, no dizziness, no double vision  or no cognitive dysfunction (decreased clarity in thinking). Some irritability. On this visit we spoke about patient's seizures, antiepileptic drug, and plan of epilepsy are. Patient/caregiver was agreeable with plan of care.  Patient and family are feeling frustrated and would like to know about future options.  I did talk to them about possible epilepsy surgery.  However it is premature at this time.  Additionally they inquired about the vagus nerve stimulator.    Before oxcarbazepine   Seizure type 1 is lightheadedness, sweatiness, dizziness, heart palpitations,  anxiety, radha vu  Feeling. He has one seizure every 3 days. Mom says 3-4 per week   Seizure type 2 is he has an aura in which he feels sick.  He feels anxiety, becomes unresponsive, has chewing movements, repetitive picking movements of his right hand. 2-4 per month. Mom states it was daily.   Seizure type 3: generalized tonic-clonic convulsion. Last one was 10/16/2018. 3-4 generalized tonic-clonic convulsion per month.     After oxcarbazepine   Seizure type 1 is lightheadedness, sweatiness, dizziness, heart palpitations, anxiety, radha vu  Feeling. Once a week.    Seizure type 2 is he has an aura in which he feels sick.  He feels anxiety, becomes unresponsive, has chewing movements, repetitive picking movements of his right hand. 2-3 per week   Seizure type 3: generalized tonic-clonic convulsion. Last one was 11/2019 and prior to this was 10/16/2018.     Prior to Admission medications    Medication Sig Start Date End Date Taking? Authorizing Provider   diazepam (VALIUM) 5 MG tablet Take 45 minutes before your MRI brain. If this is not enough, may give another 2.5 (1/2 tablet again) 15 minutes prior to MRI brain if needed. 10/16/18  Yes Sirisha Baxter MD   levETIRAcetam (KEPPRA) 750 MG tablet 1500 mg twice a day 10/16/18  Yes Sirisha Baxter MD   OXcarbazepine (TRILEPTAL) 150 MG tablet Titrate to 600 mg twice a day 10/16/18  Yes Sirisha Baxter MD            MEDICATIONS:  L               Medication Name   Tablet Size         AM  (morning)   PM (Night)   Notes     oxcarbazepine 150 mg  4 tablet    4 tablet        levetiracetam 750 mg   2 tablet    2 tablet             PAST ANTIEPILEPTIC DRUGS:    1. Depakote 500 mg twice a day was not effective 7/2016.   He was very angry   2. Levetiracetam started in fall 2016 and higher doses cause mood issues        SOCIAL HISTORY:  He was born in [place].  He lived there until 9 years old, and then he moved to [place], then [place].  He moved to [place] when he was 11  years old.  He has never had any death experiences, physical or sexual abuse.  He completed high school.  He worked at [employer] as a . He has 5 kids, ages 1, 3, 5, 7 and 8.  He is their primary caretaker.  Alcohol:  He does not drink alcohol.  He smokes 5-6 cigarettes a day.  He has 6 cans of soda per day.  He smokes marijuana 3 times per week.  In the last month, he has not felt depressed, but he does feel helpless about the future because he is not able to work, and finances are a stress.  He does not have anhedonia.  He does have trouble sleeping at night.  He falls asleep at 3 a.m. in the morning.  He usually sleeps a couple hours and may take a nap in the morning.  He does not have regular sleep habits.        REVIEW OF SYSTEMS:  Notable for headache, right eye twitching, diffuse weakness, decreased appetite, constipation.  He sleeps 5-6 hours per night, usually at 3:00 a.m. in the morning.  He does have decreased memory.      /68 (BP Location: Right arm, Patient Position: Sitting, Cuff Size: Adult Regular)   Pulse 79   Wt 132 lb 9.6 oz (60.1 kg)   BMI 19.03 kg/m    Alert, orientated, speech is fluent, face is symmetric, extra-ocular movement in tact, no focal deficits noted.Gait is stable. He is able to walk tandem gait.     IMPRESSION:    Focal impaired epilepsy     Discussion: Focal impaired Epilepsy, etiology unknown, uncontrolled: He had Video EEG 1/15/2018 during which time he had two left temporal partial seizures. MRI brain is nonlesional. He had two generalized tonic-clonic convulsion, but, it seems oxcarbazepine is helpful. Seizure onset, I suspect is from left temporal region.  We will further optimize oxcarbazepine.  Other agents to consider are lamotrigine, phenytoin, vimpat,  gabapentin , lyrica, felbamate, zonisamide, topiramate. If he continues to have memory issues, we should get ambulatory EEG for 2-3 days.     Anxiety: untreated          PLAN:                  "     Medication Name   Tablet Size        week 1-2  AM  (morning)   PM (Night)   Notes     oxcarbazepine 600 mg  1 tablet    1.5 tablet   your tablet size was changed to 600 mg tablet. Increase dose.      levetiracetam 750 mg   2 tablet    2 tablet                     Medication Name   Tablet Size       week 3 - onward  AM  (morning)   PM (Night)   Notes     oxcarbazepine 600 mg  1 tablet    2 tablet        levetiracetam 750 mg   2 tablet    2 tablet          Check labs in 4 weeks, go to [facility]  If you continue to have seizures we will have to consider another seizure medication such as lamotrigine   You can play video games   Talk to therapist about \"new normal\" and think about \"opportunities instead of limitations\"   Follow up  3-4 months    The patient was advised to maintain proper seizure precautions. [place] regulations on driving were reviewed with the patient. The patient clearly understands that she/he is prohibited from operating a motor vehicle within 3 months following any seizure or other episode with sudden unconsciousness or inability to sit up, and that it is required to report most recent seizure to the DMV within 30 days after the event.    Patient was advised to avoid any activities that might lead to self-injury or injury of others, within 3 months following any seizure with impaired awareness or impaired motor control such activities include but are not limited to operating power tools, operating firearms, climbing ladders/trees/exposure to heights from which he might fall. Patient should not operate power tools or heavy machinery and equipment.  Patient was advised not to swim alone.  Patient should not bathe in any form of tub, such as bathtub, jacuzzi, or hot tub unless there is a responsible adult close by to provide assistance in case she has a seizure and drowns. Patient should not work on hot surfaces such stoves, ovens, or with scalding hot water.       Follow up  3-5 " months         I spent 40 minutes with the patient. During this time counseling and coordination of care exceeded 50% of the face to face visit time. I addressed all questions the patient raised in regards to their medical care.           ELY RIBEIRO MD

## 2019-12-11 NOTE — LETTER
2019       RE: Geovanni Mullen  : 1990   MRN: [id number]      Dear Colleague,    Thank you for referring your patient, Geovanni Mullen, to the [facility] at [facility]. Please see a copy of my visit note below.    [facility] Progress Note    Patient:  Geovanni Mullen  :  1990   Age:  28 year old   Today's Office Visit:  2018    Epilepsy Data:  Seizure started 2016, he had aura of  lightheaded, sweaty, dizzy, heart palpitations, anxiety, radha vu sensation, and sometimes this would progress to unresponsiveness, lip smacking and repetitive hand movements.  Early on, he was treated as though these were anxiety attacks; he had one every month every other month, it seemed early on, and then this progressed to a generalized tonic-clonic convulsion in 2016.  No history of status epilepticus.   His grandmother had a stroke and had seizures.      Interval history:   Geovanni is accompanied with his girlfriend, Stephanie Torrez (Ja-lesa) and mom.He is taking levetiracetam 1500 mg twice a day.  He had two generalized tonic-clonic convulsion 2019 these were witnessed by Stephanie. He states his memory is worsening. Patient states antiepileptic drug were not missed, no active infection, no sleep deprivation, no excessive alcohol use, no obvious trigger for seizure. Despite taking antiseizure medication on regular basis, I suspect, he continues to have complex partial seizures.  He is willing to increase oxcarbazepine. He feels depressed and would like to work.     Currently, on antiepileptic drugs there is fatigue, no dizziness, no double vision  or no cognitive dysfunction (decreased clarity in thinking). Some irritability. On this visit we spoke about patient's seizures, antiepileptic drug, and plan of epilepsy are. Patient/caregiver was agreeable with plan of care.  Patient and family are feeling frustrated and would like to know about future  options.  I did talk to them about possible epilepsy surgery.  However it is premature at this time.  Additionally they inquired about the vagus nerve stimulator.    Before oxcarbazepine   Seizure type 1 is lightheadedness, sweatiness, dizziness, heart palpitations, anxiety, radha vu  Feeling. He has one seizure every 3 days. Mom says 3-4 per week   Seizure type 2 is he has an aura in which he feels sick.  He feels anxiety, becomes unresponsive, has chewing movements, repetitive picking movements of his right hand. 2-4 per month. Mom states it was daily.   Seizure type 3: generalized tonic-clonic convulsion. Last one was 10/16/2018. 3-4 generalized tonic-clonic convulsion per month.     After oxcarbazepine   Seizure type 1 is lightheadedness, sweatiness, dizziness, heart palpitations, anxiety, radha vu  Feeling. Once a week.    Seizure type 2 is he has an aura in which he feels sick.  He feels anxiety, becomes unresponsive, has chewing movements, repetitive picking movements of his right hand. 2-3 per week   Seizure type 3: generalized tonic-clonic convulsion. Last one was 11/2019 and prior to this was 10/16/2018.     Prior to Admission medications    Medication Sig Start Date End Date Taking? Authorizing Provider   diazepam (VALIUM) 5 MG tablet Take 45 minutes before your MRI brain. If this is not enough, may give another 2.5 (1/2 tablet again) 15 minutes prior to MRI brain if needed. 10/16/18  Yes Sirisha Baxter MD   levETIRAcetam (KEPPRA) 750 MG tablet 1500 mg twice a day 10/16/18  Yes Sirisha Baxter MD   OXcarbazepine (TRILEPTAL) 150 MG tablet Titrate to 600 mg twice a day 10/16/18  Yes Sirisha Baxter MD            MEDICATIONS:  L               Medication Name   Tablet Size         AM  (morning)   PM (Night)   Notes     oxcarbazepine 150 mg  4 tablet    4 tablet        levetiracetam 750 mg   2 tablet    2 tablet             PAST ANTIEPILEPTIC DRUGS:    1. Depakote 500 mg twice a day was not effective 7/2016.   He  was very angry   2. Levetiracetam started in fall 2016 and higher doses cause mood issues        SOCIAL HISTORY:  He was born in [place].  He lived there until 9 years old, and then he moved to [place], then [place].  He moved to [place] when he was 11 years old.  He has never had any death experiences, physical or sexual abuse.  He completed high school.  He worked at [employer] as a . He has 5 kids, ages 1, 3, 5, 7 and 8.  He is their primary caretaker.  Alcohol:  He does not drink alcohol.  He smokes 5-6 cigarettes a day.  He has 6 cans of soda per day.  He smokes marijuana 3 times per week.  In the last month, he has not felt depressed, but he does feel helpless about the future because he is not able to work, and finances are a stress.  He does not have anhedonia.  He does have trouble sleeping at night.  He falls asleep at 3 a.m. in the morning.  He usually sleeps a couple hours and may take a nap in the morning.  He does not have regular sleep habits.        REVIEW OF SYSTEMS:  Notable for headache, right eye twitching, diffuse weakness, decreased appetite, constipation.  He sleeps 5-6 hours per night, usually at 3:00 a.m. in the morning.  He does have decreased memory.      /68 (BP Location: Right arm, Patient Position: Sitting, Cuff Size: Adult Regular)   Pulse 79   Wt 132 lb 9.6 oz (60.1 kg)   BMI 19.03 kg/m     Alert, orientated, speech is fluent, face is symmetric, extra-ocular movement in tact, no focal deficits noted.Gait is stable. He is able to walk tandem gait.     IMPRESSION:    Focal impaired epilepsy     Discussion: Focal impaired Epilepsy, etiology unknown, uncontrolled: He had Video EEG 1/15/2018 during which time he had two left temporal partial seizures. MRI brain is nonlesional. He had two generalized tonic-clonic convulsion, but, it seems oxcarbazepine is helpful. Seizure onset, I suspect is from left temporal region.  We will further optimize  "oxcarbazepine.  Other agents to consider are lamotrigine, phenytoin, vimpat,  gabapentin , lyrica, felbamate, zonisamide, topiramate. If he continues to have memory issues, we should get ambulatory EEG for 2-3 days.     Anxiety: untreated          PLAN:                      Medication Name   Tablet Size        week 1-2  AM  (morning)   PM (Night)   Notes     oxcarbazepine 600 mg  1 tablet    1.5 tablet   your tablet size was changed to 600 mg tablet. Increase dose.      levetiracetam 750 mg   2 tablet    2 tablet                     Medication Name   Tablet Size       week 3 - onward  AM  (morning)   PM (Night)   Notes     oxcarbazepine 600 mg  1 tablet    2 tablet        levetiracetam 750 mg   2 tablet    2 tablet          Check labs in 4 weeks, go to [facility]  If you continue to have seizures we will have to consider another seizure medication such as lamotrigine   You can play video games   Talk to therapist about \"new normal\" and think about \"opportunities instead of limitations\"   Follow up  3-4 months    The patient was advised to maintain proper seizure precautions. [place] regulations on driving were reviewed with the patient. The patient clearly understands that she/he is prohibited from operating a motor vehicle within 3 months following any seizure or other episode with sudden unconsciousness or inability to sit up, and that it is required to report most recent seizure to the DMV within 30 days after the event.    Patient was advised to avoid any activities that might lead to self-injury or injury of others, within 3 months following any seizure with impaired awareness or impaired motor control such activities include but are not limited to operating power tools, operating firearms, climbing ladders/trees/exposure to heights from which he might fall. Patient should not operate power tools or heavy machinery and equipment.  Patient was advised not to swim alone.  Patient should not bathe in " any form of tub, such as bathtub, jacuzzi, or hot tub unless there is a responsible adult close by to provide assistance in case she has a seizure and drowns. Patient should not work on hot surfaces such stoves, ovens, or with scalding hot water.       Follow up  3-5 months         I spent 40 minutes with the patient. During this time counseling and coordination of care exceeded 50% of the face to face visit time. I addressed all questions the patient raised in regards to their medical care.           SIRISHA RIBEIRO MD              Again, thank you for allowing me to participate in the care of your patient.      Sincerely,    Sirisha Ribeiro MD

## 2019-12-12 RX ORDER — LEVETIRACETAM 750 MG/1
1500 TABLET ORAL 2 TIMES DAILY
Qty: 124 TABLET | Refills: 4 | Status: SHIPPED | OUTPATIENT
Start: 2019-12-12 | End: 2020-03-18

## 2019-12-12 NOTE — TELEPHONE ENCOUNTER
Refill requested for: Levetiracetam 750 mg    Last ordered:  11/27/19 - 1 month    Last Filled:     Last Clinic Visit: 12/11/19    Next Clinic Visit: 3/18/20    From last visit note:  PLAN:                              Medication Name   Tablet Size            week 1-2  AM  (morning)    PM (Night)   Notes      oxcarbazepine 600 mg  1 tablet     1.5 tablet   your tablet size was changed to 600 mg tablet. Increase dose.       levetiracetam 750 mg   2 tablet     2 tablet                            Medication Name   Tablet Size           week 3 - onward  AM  (morning)    PM (Night)   Notes      oxcarbazepine 600 mg  1 tablet     2 tablet         levetiracetam 750 mg   2 tablet     2 tablet           Action taken: Refill sent

## 2019-12-26 DIAGNOSIS — R56.9 SEIZURE (H): ICD-10-CM

## 2019-12-26 DIAGNOSIS — R68.89 SPELLS OF DECREASED ATTENTIVENESS: ICD-10-CM

## 2019-12-29 RX ORDER — OXCARBAZEPINE 150 MG/1
TABLET, FILM COATED ORAL
Qty: 360 TABLET | OUTPATIENT
Start: 2019-12-29

## 2019-12-30 NOTE — TELEPHONE ENCOUNTER
Incoming refill request from [facility] [id number] via interface.    Medication requested: oxcarbazepine 150 mg tablet  Directions: Titrate to 900 mg (6 tab) by mouth twice daily  Last refilled: 11/27/19    Last seen: 12/11/19  RTC: 3 months  Cancel: none  No-show: none  Next appt: 3/18/20    Refill refused because this prescription is outdated. Patient was placed on 600 mg tablets at his last office visit on 12/11/19.

## 2020-03-18 ENCOUNTER — VIRTUAL VISIT (OUTPATIENT)
Dept: NEUROLOGY | Facility: CLINIC | Age: 30
End: 2020-03-18
Payer: COMMERCIAL

## 2020-03-18 DIAGNOSIS — R68.89 SPELLS OF DECREASED ATTENTIVENESS: ICD-10-CM

## 2020-03-18 DIAGNOSIS — R56.9 SEIZURE (H): ICD-10-CM

## 2020-03-18 RX ORDER — LEVETIRACETAM 750 MG/1
1500 TABLET ORAL 2 TIMES DAILY
Qty: 360 TABLET | Refills: 3 | Status: SHIPPED | OUTPATIENT
Start: 2020-03-18 | End: 2021-02-15

## 2020-03-18 RX ORDER — OXCARBAZEPINE 600 MG/1
TABLET, FILM COATED ORAL
Qty: 270 TABLET | Refills: 3 | Status: SHIPPED | OUTPATIENT
Start: 2020-03-18 | End: 2021-06-02

## 2020-03-18 NOTE — PROGRESS NOTES
"Geovanni (C-O-LAChapo Mullen is a 29 year old male who is being evaluated via a billable telephone visit.      The patient has been notified of following:     \"This telephone visit will be conducted via a call between you and your physician/provider. We have found that certain health care needs can be provided without the need for a physical exam.  This service lets us provide the care you need with a short phone conversation.  If a prescription is necessary we can send it directly to your pharmacy.  If lab work is needed we can place an order for that and you can then stop by our lab to have the test done at a later time.    If during the course of the call the physician/provider feels a telephone visit is not appropriate, you will not be charged for this service.\"       Geovanni Mullen complains of    Chief Complaint   Patient presents with     Follow Up       I have reviewed and updated the patient's Past Medical History, Social History, Family History and Medication List.    ALLERGIES  Hydrocodone-acetaminophen    Naline Julia, TODD    Additional provider notes:     This visit was completed over the telephone to comply with social distancing during COVID 19 concerns. Patient/caregiver has consented to this telephone visit. Since our last visit he had 0 seizures. He has a weird feeling \"dizziness, everything is spinning\". Last seizure was 1/2020. Only happens at night time. No issues during the day time. No double vision. Patient did not fall , is compliant with anti seizure medications , did not have emergency room visit  or did not have any hospitalization . Currently, on antiepileptic drugs there is no fatigue, no upset stomach , no double vision , no mood changes (feelings of depression, irritablity, more argumentative), no insomnia  or there are symptoms of dizziness.. On this visit we spoke about patient's seizures, antiepileptic drug, and plan of epilepsy are. Patient/caregiver was agreeable with plan of care. He " does not eat prior to taking oxcarbazepine.     Social: Patient is currently not working. He has 5 weeks.     Medications: oxcarbazepine (600 mg tablet) 600-1200. Levetiracetam (750 mg tablet) 1500 mg twice a day.     IMPRESSION:    Focal impaired epilepsy     Discussion: Focal impaired Epilepsy, etiology unknown, controlled: He had Video EEG 1/15/2018 during which time he had two left temporal partial seizures. MRI brain is nonlesional. He has dizziness with oxcarbazepine at night time. I asked him to eat a meal and then take oxcarbazepine 1200 mg at night, he is agreeable to this. Also, encouraged him to call us if he has any questions or concerns.  Other agents to consider are lamotrigine, phenytoin, vimpat,  gabapentin , lyrica, felbamate, zonisamide, topiramate. If he continues to have memory issues, we should get ambulatory EEG for 2-3 days in the future.        [employer] [facility] communication efforts were made with patient over the phone to be HIPPA compliant.   I have reviewed the note as documented above.  This accurately captures the substance of my conversation with the patient.  Sirisha Baxter MD       Phone call contact time  Call Started at 11:50am  Call Ended at 12:05 am   Total time: 15 minutes

## 2020-03-18 NOTE — LETTER
Date:May 6, 2020      Patient was self referred, no letter generated. Do not send.        [facility] Information

## 2020-05-10 DIAGNOSIS — R68.89 SPELLS OF DECREASED ATTENTIVENESS: ICD-10-CM

## 2020-05-10 DIAGNOSIS — R56.9 SEIZURE (H): ICD-10-CM

## 2020-05-13 RX ORDER — LEVETIRACETAM 750 MG/1
TABLET ORAL
Qty: 124 TABLET | OUTPATIENT
Start: 2020-05-13

## 2020-06-04 DIAGNOSIS — R56.9 SEIZURE (H): ICD-10-CM

## 2020-06-08 RX ORDER — OXCARBAZEPINE 600 MG/1
TABLET, FILM COATED ORAL
Qty: 270 TABLET | Refills: 3 | OUTPATIENT
Start: 2020-06-08

## 2020-12-07 ENCOUNTER — TELEPHONE (OUTPATIENT)
Dept: NEUROLOGY | Facility: CLINIC | Age: 30
End: 2020-12-07

## 2020-12-07 NOTE — LETTER
2020       RE: Geovanni Mullen  : 1990   MRN: [id number]        To whom it may concern,     Mr. Mullen requested that we provide the following medical information to you.  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  His prior neurological evaluation is consistent with a diagnosis of epilepsy.  He is taking 2 antiseizure medications levetiracetam and oxcarbazepine.  His last seizure was 2020.  He has been compliant in his medical care and with his medications.       Sincerely,         Sirisha Baxter MD

## 2020-12-07 NOTE — TELEPHONE ENCOUNTER
What is the concern that needs to be addressed by a nurse? Patient calling to request a letter stating when we diagnosed him with seizures, the medicine he is taking to control seizures, and the various tests he has had due to his seizures.  Patient would like this emailed to him at [email or web address].    May a detailed message be left on voicemail? Yes    Date of last office visit: 3/18/20    Message routed to: [facility] RN Pool

## 2020-12-07 NOTE — LETTER
12/7/2020       RE: Geovanni Mullen  [address] [place] [id number]          Dear Geovanni Mullen,     I am writing to provide you with a summary of your care at your request. You are diagnosed with 'Focal impaired Epilepsy, etiology unknown, controlled'. You had Video EEG 1/15/2018 during which time you had two left temporal partial (focal) seizures. MRI brain in 2018 is nonlesional.   You take two medications for anti-seizure effects:    levETIRAcetam (KEPPRA) 750 MG tablet        Sig - Route: Take 2 tablets (1,500 mg) by mouth 2 times daily - Oral     OXcarbazepine (TRILEPTAL) 600 MG tablet        Sig: Titrate to 600 mg am ( 1 tablet)  and 1200 mg pm (2 tablets)       Please let us know if you have further questions.       Sincerely,       Sirisha Baxter MD

## 2020-12-08 NOTE — TELEPHONE ENCOUNTER
Letter drafted.       ---------ADDENDUM 12/21/2020: Letter updated with information provided by Dr. Baxter.

## 2021-02-13 DIAGNOSIS — R56.9 SEIZURE (H): ICD-10-CM

## 2021-02-13 DIAGNOSIS — R68.89 SPELLS OF DECREASED ATTENTIVENESS: ICD-10-CM

## 2021-02-15 RX ORDER — LEVETIRACETAM 750 MG/1
1500 TABLET ORAL 2 TIMES DAILY
Qty: 120 TABLET | Refills: 0 | Status: SHIPPED | OUTPATIENT
Start: 2021-02-15 | End: 2021-06-23

## 2021-02-15 NOTE — TELEPHONE ENCOUNTER
Last Clinic Visit:  3/18/20  NV: NONE  RTC  6 MOS   Scheduling has been notified to contact the pt for appointment.

## 2021-03-15 ENCOUNTER — VIRTUAL VISIT (OUTPATIENT)
Dept: NEUROLOGY | Facility: CLINIC | Age: 31
End: 2021-03-15
Payer: COMMERCIAL

## 2021-03-15 DIAGNOSIS — R56.9 SEIZURE (H): Primary | ICD-10-CM

## 2021-03-15 NOTE — LETTER
Date:March 17, 2021      Patient was self referred, no letter generated. Do not send.        [facility] Information

## 2021-03-15 NOTE — PROGRESS NOTES
There was no answer.  I left a message explaining the following: Last clinic visit was March 2020.  Patient was advised we are not able to renew his medications if he does not have adequate recommended follow-up.  We are not able to provide meaningful epilepsy management if there is not follow-through by Mr. Mullen.     Sirisha Baxter MD

## 2021-03-15 NOTE — PROGRESS NOTES
No answer, left voicemail message to call ahead of appt for rooming process.  Taylor Gerard, CMA

## 2021-03-15 NOTE — PROGRESS NOTES
Left several messages to call back for rooming process before video appointment. Not able to reach patient.

## 2021-06-02 DIAGNOSIS — R56.9 SEIZURE (H): ICD-10-CM

## 2021-06-02 NOTE — TELEPHONE ENCOUNTER
OXcarbazepine (TRILEPTAL) 600 MG tablet      Last Written Prescription Date:  3/18/20  Last Fill Quantity: 270,   # refills: 3  Last Office Visit : 3/18/20 recommended 6 month follow up  No showed virtual appointment 3/25/21  Future Office visit:  None scheduled    Routing refill request to provider for review/approval because:  Last sodium 12/18/18 ast 142  Nothing more recent in care everywhere nor media  Per note from 3/15/21 no show appointment:  There was no answer.  I left a message explaining the following: Last clinic visit was March 2020.  Patient was advised we are not able to renew his medications if he does not have adequate recommended follow-up.  We are not able to provide meaningful epilepsy management if there is not follow-through by Mr. Mullen.      Sirisha Baxter MD

## 2021-06-03 RX ORDER — OXCARBAZEPINE 600 MG/1
TABLET, FILM COATED ORAL
Qty: 90 TABLET | Refills: 6 | Status: SHIPPED | OUTPATIENT
Start: 2021-06-03

## 2021-06-21 DIAGNOSIS — R68.89 SPELLS OF DECREASED ATTENTIVENESS: ICD-10-CM

## 2021-06-21 DIAGNOSIS — R56.9 SEIZURE (H): ICD-10-CM

## 2021-06-23 RX ORDER — LEVETIRACETAM 750 MG/1
1500 TABLET ORAL 2 TIMES DAILY
Qty: 120 TABLET | Refills: 3 | Status: SHIPPED | OUTPATIENT
Start: 2021-06-23

## 2021-06-23 NOTE — TELEPHONE ENCOUNTER
levETIRAcetam (KEPPRA) 750 MG tablet      Last Written Prescription Date:  2/15/21  Last Fill Quantity: 120,   # refills: 0  Last Office Visit : 3/18/20, no showed 3/15/21  Future Office visit:  None scheduled    Routing refill request to provider for review/approval because:  Received 30 day stiven refill at last fill  Gap in therapy?  How taking?

## 2022-01-28 DIAGNOSIS — R56.9 SEIZURE (H): ICD-10-CM

## 2022-02-01 RX ORDER — OXCARBAZEPINE 600 MG/1
TABLET, FILM COATED ORAL
Qty: 90 TABLET | Refills: 6 | OUTPATIENT
Start: 2022-02-01

## 2022-02-01 NOTE — TELEPHONE ENCOUNTER
Chart reviewed.  Patient not seen since since March 2020  Patient has ongoing neurology care at [facility]  Will refuse refill and recommended contacting the current provider

## 2022-02-04 DIAGNOSIS — R56.9 SEIZURE (H): ICD-10-CM

## 2022-02-07 RX ORDER — OXCARBAZEPINE 600 MG/1
TABLET, FILM COATED ORAL
Qty: 90 TABLET | Refills: 6 | OUTPATIENT
Start: 2022-02-07

## 2022-02-07 NOTE — TELEPHONE ENCOUNTER
Medication Refill request received for   Pending Prescriptions:                       Disp   Refills    OXcarbazepine (TRILEPTAL) 600 MG tablet   90 tab*6            Sig: Titrate to 600 mg am ( 1 tablet)  and 1200 mg pm           (2 tablets) For additional refills, please           schedule a follow-up appointment at [phone].           Overdue for lab      Last Office Visit : 3/2020      Refill request was Refused. Patient is under the care of another provider: Torey Shedlon at [facility].

## 2022-06-22 NOTE — PROGRESS NOTES
Time/length of seizure event: 2:25p, 3 minutes  Movements (head, eyes, extremities):Picling at pulse ox and leads  Orientation during seizure:Confused  After seizure, remembers the unique phrase given during seizure:No  After seizure, remembers an unnamed visual object shown during seizure:Yes  Able to identify/name aloud item during seizure:Yes  Able to follow command during seizure:No  Able to read test sentence aloud during seizure:Yes  Able to read test sentence aloud again after the seizure:Yes  After seizure, remembers name of object shown during seizure:Yes  Orientation and level of consciousness after seizure:Confused  VS and oxygen saturation during/after seizure:VSS  Recall of the event?:No  Was this a typical seizure/event?:Yes  Presence of aura or pre-seizure activity:NA  Incontinence:No    
Time/length of seizure event: 5 minutes  Movements (head, eyes, extremities): Grabbing, squeezing, attempting to chew objects  Orientation during seizure: confused, nonresponsive to questions  After seizure, remembers the unique phrase given during seizure:no  After seizure, remembers an unnamed visual object shown during seizure:no  Able to identify/name aloud item during seizure:no  Able to follow command during seizure:no  Able to read test sentence aloud during seizure:no  Able to read test sentence aloud again after the seizure:no  After seizure, remembers name of object shown during seizure:no  Orientation and level of consciousness after seizure:Confused  VS and oxygen saturation during/after seizure:VSS  Recall of the event?:No  Was this a typical seizure/event?:Yes  Presence of aura or pre-seizure activity:NA  Incontinence:No    
Statement Selected